# Patient Record
Sex: FEMALE | Race: BLACK OR AFRICAN AMERICAN | Employment: FULL TIME | URBAN - METROPOLITAN AREA
[De-identification: names, ages, dates, MRNs, and addresses within clinical notes are randomized per-mention and may not be internally consistent; named-entity substitution may affect disease eponyms.]

---

## 2022-07-08 ENCOUNTER — HOSPITAL ENCOUNTER (OUTPATIENT)
Age: 32
Setting detail: SPECIMEN
Discharge: HOME | End: 2022-07-08

## 2022-07-08 ENCOUNTER — HOSPITAL ENCOUNTER (OUTPATIENT)
Age: 32
Discharge: HOME | End: 2022-07-08
Payer: MEDICAID

## 2022-07-08 LAB
ABSOLUTE EOS #: 0.05 K/UL (ref 0–0.44)
ABSOLUTE IMMATURE GRANULOCYTE: <0.03 K/UL (ref 0–0.3)
ABSOLUTE LYMPH #: 2.64 K/UL (ref 1.1–3.7)
ABSOLUTE MONO #: 0.53 K/UL (ref 0.1–1.2)
ABSOLUTE RETIC #: 0.09 M/UL (ref 0.03–0.08)
ALBUMIN SERPL-MCNC: 4.3 G/DL (ref 3.5–5.2)
ALBUMIN/GLOBULIN RATIO: 1.7 (ref 1–2.5)
ALP BLD-CCNC: 59 U/L (ref 35–104)
ALT SERPL-CCNC: 13 U/L (ref 5–33)
ANION GAP SERPL CALCULATED.3IONS-SCNC: 12 MMOL/L (ref 9–17)
AST SERPL-CCNC: 19 U/L
BASOPHILS # BLD: 0 % (ref 0–2)
BASOPHILS ABSOLUTE: <0.03 K/UL (ref 0–0.2)
BILIRUB SERPL-MCNC: 0.21 MG/DL (ref 0.3–1.2)
BUN BLDV-MCNC: 8 MG/DL (ref 6–20)
CALCIUM SERPL-MCNC: 9.1 MG/DL (ref 8.6–10.4)
CHLORIDE BLD-SCNC: 102 MMOL/L (ref 98–107)
CHOLESTEROL/HDL RATIO: 2.5
CHOLESTEROL: 106 MG/DL
CO2: 21 MMOL/L (ref 20–31)
CREAT SERPL-MCNC: 0.75 MG/DL (ref 0.5–0.9)
EOSINOPHILS RELATIVE PERCENT: 1 % (ref 1–4)
GFR AFRICAN AMERICAN: >60 ML/MIN
GFR NON-AFRICAN AMERICAN: >60 ML/MIN
GFR SERPL CREATININE-BSD FRML MDRD: ABNORMAL ML/MIN/{1.73_M2}
GLUCOSE BLD-MCNC: 82 MG/DL (ref 70–99)
HCT VFR BLD CALC: 38.6 % (ref 36.3–47.1)
HDLC SERPL-MCNC: 43 MG/DL
HEMOGLOBIN: 12.2 G/DL (ref 11.9–15.1)
HEPATITIS B SURFACE ANTIGEN: NONREACTIVE
IMMATURE GRANULOCYTES: 0 %
IMMATURE RETIC FRACT: 15.1 % (ref 2.7–18.3)
IRON SATURATION: 20 % (ref 20–55)
IRON: 78 UG/DL (ref 37–145)
LDL CHOLESTEROL: 41 MG/DL (ref 0–130)
LYMPHOCYTES # BLD: 32 % (ref 24–43)
MCH RBC QN AUTO: 27.5 PG (ref 25.2–33.5)
MCHC RBC AUTO-ENTMCNC: 31.6 G/DL (ref 28.4–34.8)
MCV RBC AUTO: 86.9 FL (ref 82.6–102.9)
MONOCYTES # BLD: 6 % (ref 3–12)
NRBC AUTOMATED: 0 PER 100 WBC
PDW BLD-RTO: 14.1 % (ref 11.8–14.4)
PLATELET # BLD: 465 K/UL (ref 138–453)
PMV BLD AUTO: 9.8 FL (ref 8.1–13.5)
POTASSIUM SERPL-SCNC: 4.1 MMOL/L (ref 3.7–5.3)
RBC # BLD: 4.44 M/UL (ref 3.95–5.11)
RETIC %: 2.1 % (ref 0.5–1.9)
RETIC HEMOGLOBIN: 33.3 PG (ref 28.2–35.7)
SEG NEUTROPHILS: 61 % (ref 36–65)
SEGMENTED NEUTROPHILS ABSOLUTE COUNT: 5.1 K/UL (ref 1.5–8.1)
SODIUM BLD-SCNC: 135 MMOL/L (ref 135–144)
TOTAL IRON BINDING CAPACITY: 396 UG/DL (ref 250–450)
TOTAL PROTEIN: 6.8 G/DL (ref 6.4–8.3)
TRIGL SERPL-MCNC: 108 MG/DL
TSH SERPL DL<=0.05 MIU/L-ACNC: 1.13 UIU/ML (ref 0.3–5)
UNSATURATED IRON BINDING CAPACITY: 318 UG/DL (ref 112–347)
VITAMIN D 25-HYDROXY: 20.6 NG/ML
WBC # BLD: 8.4 K/UL (ref 3.5–11.3)

## 2022-07-08 PROCEDURE — 83550 IRON BINDING TEST: CPT

## 2022-07-08 PROCEDURE — 86704 HEP B CORE ANTIBODY TOTAL: CPT

## 2022-07-08 PROCEDURE — 85025 COMPLETE CBC W/AUTO DIFF WBC: CPT

## 2022-07-08 PROCEDURE — 84443 ASSAY THYROID STIM HORMONE: CPT

## 2022-07-08 PROCEDURE — 80061 LIPID PANEL: CPT

## 2022-07-08 PROCEDURE — 85045 AUTOMATED RETICULOCYTE COUNT: CPT

## 2022-07-08 PROCEDURE — 82306 VITAMIN D 25 HYDROXY: CPT

## 2022-07-08 PROCEDURE — 87340 HEPATITIS B SURFACE AG IA: CPT

## 2022-07-08 PROCEDURE — 86317 IMMUNOASSAY INFECTIOUS AGENT: CPT

## 2022-07-08 PROCEDURE — 80053 COMPREHEN METABOLIC PANEL: CPT

## 2022-07-08 PROCEDURE — 86708 HEPATITIS A ANTIBODY: CPT

## 2022-07-08 PROCEDURE — 36415 COLL VENOUS BLD VENIPUNCTURE: CPT

## 2022-07-08 PROCEDURE — 83540 ASSAY OF IRON: CPT

## 2022-07-09 LAB
HAV AB SERPL IA-ACNC: NONREACTIVE
HEPATITIS B CORE TOTAL ANTIBODY: NONREACTIVE
SOURCE: NORMAL
TRICHOMONAS VAGINALI, MOLECULAR: NEGATIVE

## 2022-07-10 LAB — HBV SURFACE AB TITR SER: >1000 MIU/ML

## 2022-07-11 LAB
C. TRACHOMATIS DNA ,URINE: NEGATIVE
N. GONORRHOEAE DNA, URINE: NEGATIVE
SPECIMEN DESCRIPTION: NORMAL

## 2022-09-22 ENCOUNTER — HOSPITAL ENCOUNTER (EMERGENCY)
Age: 32
Discharge: HOME | End: 2022-09-22
Attending: EMERGENCY MEDICINE
Payer: MEDICAID

## 2022-09-22 VITALS
SYSTOLIC BLOOD PRESSURE: 147 MMHG | HEART RATE: 78 BPM | WEIGHT: 208 LBS | OXYGEN SATURATION: 99 % | DIASTOLIC BLOOD PRESSURE: 84 MMHG | HEIGHT: 63 IN | BODY MASS INDEX: 36.86 KG/M2 | TEMPERATURE: 99 F | RESPIRATION RATE: 18 BRPM

## 2022-09-22 DIAGNOSIS — R21 RASH AND OTHER NONSPECIFIC SKIN ERUPTION: Primary | ICD-10-CM

## 2022-09-22 PROCEDURE — 99283 EMERGENCY DEPT VISIT LOW MDM: CPT

## 2022-09-22 PROCEDURE — 6370000000 HC RX 637 (ALT 250 FOR IP): Performed by: STUDENT IN AN ORGANIZED HEALTH CARE EDUCATION/TRAINING PROGRAM

## 2022-09-22 RX ORDER — DIAPER,BRIEF,INFANT-TODD,DISP
EACH MISCELLANEOUS
Qty: 20 G | Refills: 0 | Status: SHIPPED | OUTPATIENT
Start: 2022-09-22 | End: 2022-09-29

## 2022-09-22 RX ORDER — CETIRIZINE HYDROCHLORIDE 10 MG/1
10 TABLET ORAL DAILY
Qty: 10 TABLET | Refills: 0 | Status: SHIPPED | OUTPATIENT
Start: 2022-09-22 | End: 2022-10-02

## 2022-09-22 RX ORDER — CETIRIZINE HYDROCHLORIDE 10 MG/1
10 TABLET ORAL ONCE
Status: COMPLETED | OUTPATIENT
Start: 2022-09-22 | End: 2022-09-22

## 2022-09-22 RX ADMIN — CETIRIZINE HYDROCHLORIDE 10 MG: 10 TABLET ORAL at 09:02

## 2022-09-22 ASSESSMENT — ENCOUNTER SYMPTOMS
NAUSEA: 0
VOMITING: 0
SHORTNESS OF BREATH: 0
COUGH: 0
BACK PAIN: 0
ABDOMINAL PAIN: 0

## 2022-09-22 ASSESSMENT — PAIN - FUNCTIONAL ASSESSMENT: PAIN_FUNCTIONAL_ASSESSMENT: NONE - DENIES PAIN

## 2022-09-22 NOTE — ED PROVIDER NOTES
[facility]     Emergency Department     Faculty Attestation    I performed a history and physical examination of the patient and discussed management with the resident. I have reviewed and agree with the residents findings including all diagnostic interpretations, and treatment plans as written at the time of my review. Any areas of disagreement are noted on the chart. I was personally present for the key portions of any procedures. I have documented in the chart those procedures where I was not present during the key portions. For Physician Assistant/ Nurse Practitioner cases/documentation I have personally evaluated this patient and have completed at least one if not all key elements of the E/M (history, physical exam, and MDM). Additional findings are as noted. Primary Care Physician: No primary care provider on file. History: This is a 34 y.o. female who presents to the Emergency Department with complaint of rash. Patient presents emerged with complaint of a rash that has been ongoing for last 2 to 3 months. Patient states they come and go. The.  As red bumps. She scratches them and then they open up and drain. No other family members have a similar issue. She denies any difficulty breathing swallowing chest tightness. Patient denies any fever, chills or sweats. Physical:   height is 5' 3\" (1.6 m) and weight is 208 lb (94.3 kg). Her oral temperature is 99 °F (37.2 °C). Her blood pressure is 147/84 (abnormal) and her pulse is 78. Her respiration is 18 and oxygen saturation is 99%. Patient has several very small 2 to 3 mm erythematous lesions noted on the extremities. There appear to be in different stages. I do not see any vesicular lesions. There is no surrounding erythema.     Impression: Skin rash    Plan: Topical hydrocortisone cream.  Patient was given PCP follow-up list.    (Please note that portions of this note were completed with a voice recognition program.  Efforts were made to edit the dictations but occasionally words are mis-transcribed.)    [place].  Mylene Alvarenga MD, [facility]  Attending Emergency Medicine Physician        Cynthia Morales MD  09/22/22 5792

## 2022-09-22 NOTE — ED NOTES
Pt arrived to ED by self. Pt complains of an itching rash that has been present on both upper and lower extremities bilaterally that has been present for about 2 months now. The pt states that scabs form after scratching the areas. Pt denies any medical history. Pt states that they donate plasma regularly and is concerned of mckinley something from this. Pt is also concerned for bed bugs or monkey pox. Pt alert and oriented.       Vernon Steve RN  09/22/22 5710

## 2022-09-22 NOTE — ED PROVIDER NOTES
[facility] ED  Emergency Department Encounter  Emergency Medicine Resident     Pt Name: Madalyn Carrasco  MRN: [id number]  [place] 1990  Date of evaluation: 9/22/22  PCP:  No primary care provider on file. CHIEF COMPLAINT       Chief Complaint   Patient presents with    Pruritis       HISTORY OFPRESENT ILLNESS  (Location/Symptom, Timing/Onset, Context/Setting, Quality, Duration, Modifying Factors,Severity.)      Madalyn Carrasco is a 34 y. o.yo female who presents with nonspecific pruritic rash that started about 2 months ago that patient explains that is over her bilateral upper extremities and lower extremities. Patient states that she thought that she could have been exposed to a gentleman who had similar rash but unsure. Patient concerned that she might have mild hypoxia. She states that the rash is worsened at nighttime. She states that no one else in the household has similar rash presentation. She denies any fever, night sweats, generalized body aches. She did complain of intermittent chills but no cough, no shortness of breath, no chest pain. Patient states that she has not taken anything for her rash. PAST MEDICAL / SURGICAL / SOCIAL / FAMILY HISTORY      has no past medical history on file. has no past surgical history on file.      Social History     Socioeconomic History    Marital status: Single     Spouse name: Not on file    Number of children: Not on file    Years of education: Not on file    Highest education level: Not on file   Occupational History    Not on file   Tobacco Use    Smoking status: Not on file    Smokeless tobacco: Not on file   Substance and Sexual Activity    Alcohol use: Not on file    Drug use: Yes     Types: Marijuana Nanci Gouty)    Sexual activity: Not on file   Other Topics Concern    Not on file   Social History Narrative    Not on file     Social Determinants of Health     Financial Resource Strain: Not on file   Food Insecurity: Not on file   Transportation Needs: Not on file   Physical Activity: Not on file   Stress: Not on file   Social Connections: Not on file   Intimate Partner Violence: Not on file   Housing Stability: Not on file       No family history on file. Allergies:  Patient has no known allergies. Home Medications:  Prior to Admission medications    Medication Sig Start Date End Date Taking? Authorizing Provider   cetirizine (ZYRTEC) 10 MG tablet Take 1 tablet by mouth daily for 10 days 9/22/22 10/2/22 Yes Akil Vargas MD   hydrocortisone 1 % cream Apply topically 2 -3 times daily for 5 - 7 days. 9/22/22 9/29/22 Yes Akil Vargas MD       REVIEW OFSYSTEMS    (2-9 systems for level 4, 10 or more for level 5)      Review of Systems   Constitutional:  Positive for chills. Negative for fever. HENT:  Negative for dental problem. Eyes:  Negative for visual disturbance. Respiratory:  Negative for cough and shortness of breath. Cardiovascular:  Negative for chest pain and leg swelling. Gastrointestinal:  Negative for abdominal pain, nausea and vomiting. Genitourinary:  Negative for flank pain and frequency. Musculoskeletal:  Negative for back pain. Skin:  Positive for rash. Neurological:  Negative for light-headedness. Psychiatric/Behavioral:  Negative for behavioral problems and confusion. PHYSICAL EXAM   (up to 7 for level 4, 8 or more forlevel 5)      INITIAL VITALS:   ED Triage Vitals [09/22/22 0816]   BP Temp Temp Source Heart Rate Resp SpO2 Height Weight   (!) 147/84 99 °F (37.2 °C) Oral 78 18 99 % 5' 3\" (1.6 m) 208 lb (94.3 kg)       Physical Exam  Constitutional:       Appearance: Normal appearance. HENT:      Head: Normocephalic and atraumatic. Nose: Nose normal.      Mouth/Throat:      Mouth: Mucous membranes are moist.   Eyes:      Extraocular Movements: Extraocular movements intact. Pupils: Pupils are equal, round, and reactive to light.    Cardiovascular:      Rate and Rhythm: Normal rate. Pulmonary:      Effort: Pulmonary effort is normal. No respiratory distress. Abdominal:      General: There is no distension. Palpations: Abdomen is soft. Tenderness: There is no abdominal tenderness. Musculoskeletal:         General: No swelling or tenderness. Normal range of motion. Cervical back: Normal range of motion. No rigidity. Skin:     Findings: Rash present. Comments: Patient does have excoriated rash over her bilateral forearm, her leg. No signs of acute rash that is nonblanching or that is even blanching, no erythema,   Neurological:      General: No focal deficit present. Mental Status: She is alert. Psychiatric:         Mood and Affect: Mood normal.         Behavior: Behavior normal.       DIFFERENTIAL  DIAGNOSIS     PLAN (LABS / IMAGING / EKG):  No orders of the defined types were placed in this encounter. MEDICATIONS ORDERED:  Orders Placed This Encounter   Medications    cetirizine (ZYRTEC) tablet 10 mg    cetirizine (ZYRTEC) 10 MG tablet     Sig: Take 1 tablet by mouth daily for 10 days     Dispense:  10 tablet     Refill:  0    hydrocortisone 1 % cream     Sig: Apply topically 2 -3 times daily for 5 - 7 days. Dispense:  20 g     Refill:  0       Initial MDM/Plan: 34 y.o. female who presents with non specific rash   Rash that is pruritic. Plan for Zyrtec to help with symptoms. Will be discharged with hydrocortisone cream.  Patient given strict return precautions to return if things are not getting better. Follow-up with PCP. DIAGNOSTIC RESULTS / EMERGENCYDEPARTMENT COURSE / MDM     LABS:  Labs Reviewed - No data to display      RADIOLOGY:  No results found.       EKG      All EKG's are interpreted by the Emergency Department Physicianwho either signs or Co-signs this chart in the absence of a cardiologist.    EMERGENCY DEPARTMENT COURSE:          PROCEDURES:  None    CONSULTS:  None    CRITICAL CARE:      FINAL IMPRESSION      1.

## 2022-09-22 NOTE — Clinical Note
Martha Lance was seen and treated in our emergency department on 9/22/2022. She may return to work on 09/22/2022. If you have any questions or concerns, please don't hesitate to call.       Coby Angel MD

## 2022-09-22 NOTE — DISCHARGE INSTRUCTIONS
You will be started on hydrocortisone cream for the rash. You will be started on Zyrtec to help with the itching. Please use prescribed. Do not use a hydrocortisone cream for more than 5 to 7 days. If rash is worsening you develop fever, there is nausea and vomiting, there is chest pain, there is shortness of breath, please return to the emergency room for evaluation. Please also follow-up with [facility] to establish PCP.

## 2022-09-26 ENCOUNTER — HOSPITAL ENCOUNTER (EMERGENCY)
Age: 32
Discharge: HOME | End: 2022-09-27
Attending: EMERGENCY MEDICINE
Payer: MEDICAID

## 2022-09-26 VITALS
DIASTOLIC BLOOD PRESSURE: 81 MMHG | HEIGHT: 63 IN | WEIGHT: 208 LBS | TEMPERATURE: 97.9 F | HEART RATE: 96 BPM | RESPIRATION RATE: 16 BRPM | OXYGEN SATURATION: 100 % | SYSTOLIC BLOOD PRESSURE: 144 MMHG | BODY MASS INDEX: 36.86 KG/M2

## 2022-09-26 DIAGNOSIS — S61.212A LACERATION OF RIGHT MIDDLE FINGER WITHOUT FOREIGN BODY WITHOUT DAMAGE TO NAIL, INITIAL ENCOUNTER: Primary | ICD-10-CM

## 2022-09-26 PROCEDURE — 12001 RPR S/N/AX/GEN/TRNK 2.5CM/<: CPT

## 2022-09-26 PROCEDURE — 99283 EMERGENCY DEPT VISIT LOW MDM: CPT

## 2022-09-26 ASSESSMENT — PAIN - FUNCTIONAL ASSESSMENT: PAIN_FUNCTIONAL_ASSESSMENT: 0-10

## 2022-09-26 ASSESSMENT — PAIN SCALES - GENERAL: PAINLEVEL_OUTOF10: 10

## 2022-09-26 ASSESSMENT — PAIN DESCRIPTION - LOCATION: LOCATION: FINGER (COMMENT WHICH ONE)

## 2022-09-26 ASSESSMENT — PAIN DESCRIPTION - ORIENTATION: ORIENTATION: RIGHT

## 2022-09-26 NOTE — LETTER
[facility]  [address] [address]  Phone: [phone]               September 28, 2022    Patient: Laura Ortega   YOB: 1990   Date of Visit: 9/26/2022       To Whom It May Concern:    Laura Ortega was seen and treated in our emergency department on 9/26/2022. She may return to work on 09/28/2022 100% no restrictions. Please allow her to keep her wound clean and dry until the sutures can be removed 10/4/2022. She will need to be able to keep a bandage on her hand under her gloves and be able to take breaks to change this bandage if it gets wet or soiled. If you have any questions or concerns, please don't hesitate to call.       Kailey Sutherland DO        Signature:__________________________________

## 2022-09-26 NOTE — Clinical Note
Celso Mckeon was seen and treated in our emergency department on 9/26/2022. She may return to work on 09/28/2022. Please allow her to keep her wound clean and dry until the sutures can be removed 10/4/2022. She will need to be able to keep a bandage on her hand under her gloves and be able to take breaks to change this bandage if it gets wet or soiled. If you have any questions or concerns, please don't hesitate to call.       Louisa Angulo, DO

## 2022-09-27 PROCEDURE — 6370000000 HC RX 637 (ALT 250 FOR IP): Performed by: STUDENT IN AN ORGANIZED HEALTH CARE EDUCATION/TRAINING PROGRAM

## 2022-09-27 RX ORDER — CEPHALEXIN 500 MG/1
500 CAPSULE ORAL ONCE
Status: COMPLETED | OUTPATIENT
Start: 2022-09-27 | End: 2022-09-27

## 2022-09-27 RX ORDER — BACITRACIN, NEOMYCIN, POLYMYXIN B 400; 3.5; 5 [USP'U]/G; MG/G; [USP'U]/G
OINTMENT TOPICAL
Qty: 28 G | Refills: 0 | Status: SHIPPED | OUTPATIENT
Start: 2022-09-27 | End: 2022-10-07

## 2022-09-27 RX ORDER — CEPHALEXIN 500 MG/1
500 CAPSULE ORAL 2 TIMES DAILY
Qty: 14 CAPSULE | Refills: 0 | Status: SHIPPED | OUTPATIENT
Start: 2022-09-27 | End: 2022-10-04

## 2022-09-27 RX ADMIN — CEPHALEXIN 500 MG: 500 CAPSULE ORAL at 01:25

## 2022-09-27 ASSESSMENT — ENCOUNTER SYMPTOMS
CONSTIPATION: 0
NAUSEA: 0
ABDOMINAL PAIN: 0
COUGH: 0
DIARRHEA: 0
RHINORRHEA: 0
SHORTNESS OF BREATH: 0
BACK PAIN: 0
SORE THROAT: 0
VOMITING: 0

## 2022-09-27 NOTE — ED PROVIDER NOTES
[facility]  ED  Emergency Department Encounter  Emergency Medicine Resident     Pt Name: Lorelei Quinones  MRN: [id number]  [place] 1990  Date of evaluation: 9/27/22  PCP:  No primary care provider on file. CHIEF COMPLAINT       Finger laceration      HISTORY OFPRESENT ILLNESS  (Location/Symptom, Timing/Onset, Context/Setting, Quality, Duration, Modifying Zoie Puckett.)      Lorelei Quinones is a 34 y.o. female who presents with a laceration to her right middle finger which occurred a few hours ago on a metal broom stick. Patient states there was a large amount of bleeding initially but it is now controlled. She denies any other injuries. She denies numbness, tingling, weakness. Patient states she washed out the wound at home but has not taken any medications for her symptoms. Patient's tetanus is not up-to-date. PAST MEDICAL / SURGICAL / SOCIAL / FAMILY HISTORY     Patient denies past medical or surgical history    Social:  reports current drug use. Drug: Marijuana Brynn Guzmán). Family Hx: No family history on file. Allergies:  Patient has no known allergies. Home Medications:  Prior to Admission medications    Medication Sig Start Date End Date Taking? Authorizing Provider   cephALEXin (KEFLEX) 500 MG capsule Take 1 capsule by mouth 2 times daily for 7 days 9/27/22 10/4/22 Yes Ambreen Anderson, DO   neomycin-bacitracin-polymyxin (NEOSPORIN) 400-5-5000 ointment Apply topically 2 times daily. 9/27/22 10/7/22 Yes Ambreen Harman, DO   cetirizine (ZYRTEC) 10 MG tablet Take 1 tablet by mouth daily for 10 days 9/22/22 10/2/22  Gita Tuttle MD   hydrocortisone 1 % cream Apply topically 2 -3 times daily for 5 - 7 days. 9/22/22 9/29/22  Gita Tuttle MD       REVIEW OFSYSTEMS    (2-9 systems for level 4, 10 or more for level 5)      Review of Systems   Constitutional:  Negative for chills and fever. HENT:  Negative for congestion, rhinorrhea and sore throat. Respiratory:  Negative for cough and shortness of breath. Cardiovascular:  Negative for chest pain and leg swelling. Gastrointestinal:  Negative for abdominal pain, constipation, diarrhea, nausea and vomiting. Musculoskeletal:  Negative for arthralgias, back pain and neck pain. Skin:  Positive for wound. Negative for rash. Neurological:  Negative for weakness, light-headedness, numbness and headaches. All other systems reviewed and are negative. PHYSICAL EXAM   (up to 7 for level 4, 8 or more forlevel 5)      INITIAL VITALS:   Vitals:    09/26/22 2322   BP: (!) 144/81   Pulse: 96   Resp: 16   Temp: 97.9 °F (36.6 °C)   TempSrc: Oral   SpO2: 100%   Weight: 208 lb (94.3 kg)   Height: 5' 3\" (1.6 m)        Physical Exam  Vitals and nursing note reviewed. Constitutional:       General: She is not in acute distress. Comments: Adult female sitting in stretcher no acute distress. HENT:      Head: Normocephalic and atraumatic. Mouth/Throat:      Mouth: Mucous membranes are moist.      Pharynx: Oropharynx is clear. Eyes:      Pupils: Pupils are equal, round, and reactive to light. Cardiovascular:      Rate and Rhythm: Normal rate and regular rhythm. Pulmonary:      Effort: Pulmonary effort is normal. No respiratory distress. Breath sounds: Normal breath sounds. Abdominal:      General: There is no distension. Palpations: Abdomen is soft. Tenderness: There is no abdominal tenderness. Musculoskeletal:      Cervical back: Neck supple. No rigidity. Skin:     General: Skin is warm and dry. Findings: No rash. Comments: 2 cm linear laceration to the palmar aspect of the middle finger on the right hand just distal to the PIP joint. Bleeding is controlled. Range of motion and sensation intact. 2+ radial pulses bilaterally. Neurological:      Mental Status: She is alert.    Psychiatric:         Mood and Affect: Mood normal.         Behavior: Behavior normal. DIFFERENTIAL  DIAGNOSIS     DDX: Laceration    Initial MDM/Plan: 34 y.o. female who presents with a laceration to her right middle finger which occurred a few hours ago on a metal broom stick. Bleeding is controlled. Physical exam is otherwise benign. Given the location of the laceration, even though small and somewhat superficial, do think it requires sutures. Will wash wound thoroughly and repair. Will update patient's tetanus. DIAGNOSTIC RESULTS / EMERGENCYDEPARTMENT COURSE / MDM     LABS:  No results found for this visit on 09/26/22. RADIOLOGY:  No results found. EKG  None      MEDICATIONS ORDERED:  Orders Placed This Encounter   Medications    DISCONTD: Tetanus-Diphth-Acell Pertussis (BOOSTRIX) injection 0.5 mL    cephALEXin (KEFLEX) capsule 500 mg     Order Specific Question:   Antimicrobial Indications     Answer:   Skin and Soft Tissue Infection    cephALEXin (KEFLEX) 500 MG capsule     Sig: Take 1 capsule by mouth 2 times daily for 7 days     Dispense:  14 capsule     Refill:  0    neomycin-bacitracin-polymyxin (NEOSPORIN) 400-5-5000 ointment     Sig: Apply topically 2 times daily. Dispense:  28 g     Refill:  0         PROCEDURES:  PROCEDURE NOTE - LACERATION CLOSURE    PATIENT NAME: Santana Whitley  MEDICAL RECORD NO. [id number]  DATE: 9/27/2022  ATTENDING PHYSICIAN: Dr Nichelle Harrell DIAGNOSIS: Laceration(s) as follows:  -Location: right 3rd digit  -Length: 1.5 cm  -Layered closure: No    POSTOPERATIVE DIAGNOSIS:  Same  PROCEDURE PERFORMED:  Suture closure of laceration  PERFORMING PHYSICIAN: Rebekah Lama DO  ANESTHESIA:  Local utilizing  Lidocaine 1% without epinephrine  ESTIMATED BLOOD LOSS:  Less than 25 ml. DISCUSSION:  Santana Whitley is a 34y.o.-year-old female. Patient requires laceration repair. The history and physical examination were reviewed and confirmed. CONSENT: The patient provided verbal consent for this procedure.      PROCEDURE:  Prior to starting, the procedure and patient were confirmed by those present. The wound area was irrigated with sterile saline, cleansed with povidone iodine and draped in a sterile fashion. The wound area was anesthetized with Lidocaine 1% without epinephrine. The wound was explored with the following results No foreign bodies found. The wound was repaired with 4-0 Ethilon using interrupted sutures. The wound was dressed with bacitracin and a bandage. All sponge, instrument and needle counts were correct at the completion of the procedure. The patient tolerated the procedure well. SUTURE COUNT:  Suture count: 3    COMPLICATIONS:  None     Lynchbeatrice Meyer, DO  2:45 AM, 9/27/22          CONSULTS:  None      EMERGENCY DEPARTMENT COURSE:  0045: Laceration was repaired without complication. Patient tolerated well. She declined her tetanus shot. Risks and benefits of doing so were discussed with the patient and she still declines at this time. Patient was given dose of Keflex here and will be discharged home on the same, as well as a prescription for bacitracin. Work note provided. She is to return for any worsening symptoms. FINAL IMPRESSION      1.  Laceration of right middle finger without foreign body without damage to nail, initial encounter          DISPOSITION / PLAN     DISPOSITION Decision To Discharge 09/27/2022 12:52:48 AM      PATIENT REFERRED TO:  [address] [place] [id number] [phone]  Schedule an appointment as soon as possible for a visit       [facility] [place] ED  [address] [place] [address]  [phone]    If symptoms worsen    DISCHARGE MEDICATIONS:  Discharge Medication List as of 9/27/2022 12:58 AM        START taking these medications    Details   cephALEXin (KEFLEX) 500 MG capsule Take 1 capsule by mouth 2 times daily for 7 days, Disp-14 capsule, R-0Print      neomycin-bacitracin-polymyxin (NEOSPORIN) 400-5-5000 ointment Apply topically 2 times daily. , Disp-28 g, R-0, Print             Mike Estevez DO  Emergency Medicine Resident    (Please note that portions of this note were completed with a voice recognition program.Efforts were made to edit the dictations but occasionally words are mis-transcribed.)        Mike Estevez DO  Resident  09/27/22 7384

## 2022-09-27 NOTE — DISCHARGE INSTRUCTIONS
For your symptoms, we repaired your laceration with 3 stitches. These will need to be removed in 7 days. You can return to the emergency department go to your primary care or go to an urgent care to have these removed. Suture removal date: 10/4/22    To care for your wound at home, wash with warm water and gentle unscented soap like Dove soap or Shruthi dish soap. You do not need to scrub the wound but pat dry. Please keep the wound clean and dry. Place bacitracin or Neosporin over the wound several times a day to keep the sutures moist.  This will help and be easier to remove, prevent infection and prevent scarring. Avoid soaking your hand in any water such as pools, bath tubs, hot tubs, lakes, etc. until the sutures are removed. You will need to take an antibiotic called Keflex twice a day for the next 7 days. Please take all 7 days of this medication to prevent infection. We recommended a tetanus shot today, however you declined. I would still recommend you getting the tetanus shot. If you change your mind you can go to your primary care physician or the health department. You can also return for the shot when to get your sutures removed. If you develop any worsening of her symptoms, severe pain, chest pain, trouble breathing, passing out, fevers, vomiting, worsening pain, redness, swelling, drainage from the wound that looks like pus or other worrisome symptoms, please return to the emergency department immediately.

## 2022-09-27 NOTE — ED PROVIDER NOTES
[facility]     Emergency Department     Faculty Attestation    I performed a history and physical examination of the patient and discussed management with the resident. I have reviewed and agree with the residents findings including all diagnostic interpretations, and treatment plans as written. Any areas of disagreement are noted on the chart. I was personally present for the key portions of any procedures. I have documented in the chart those procedures where I was not present during the key portions. I have reviewed the emergency nurses triage note. I agree with the chief complaint, past medical history, past surgical history, allergies, medications, social and family history as documented unless otherwise noted below. Documentation of the HPI, Physical Exam and Medical Decision Making performed by scribkadie is based on my personal performance of the HPI, PE and MDM. For Physician Assistant/ Nurse Practitioner cases/documentation I have personally evaluated this patient and have completed at least one if not all key elements of the E/M (history, physical exam, and MDM). Additional findings are as noted. 33 yo F laceration R 3rd finger lac, last dT > 8 day,   PE vss laceration volar surface R 3rd digit, at pip, tendon function intact,   Nv intact R 3rd digit,     -wound cleansed / + closure / I was present for key portion,     EKG Interpretation    Interpreted by me      CRITICAL CARE: There was a high probability of clinically significant/life threatening deterioration in this patient's condition which required my urgent intervention. Total critical care time was 0 minutes. This excludes any time for separately reportable procedures.        [address], DO  09/27/22 [address] , DO  09/27/22 0711

## 2022-10-03 ENCOUNTER — HOSPITAL ENCOUNTER (EMERGENCY)
Age: 32
Discharge: HOME | End: 2022-10-03
Attending: EMERGENCY MEDICINE
Payer: MEDICAID

## 2022-10-03 VITALS
BODY MASS INDEX: 36.86 KG/M2 | RESPIRATION RATE: 16 BRPM | WEIGHT: 208 LBS | HEIGHT: 63 IN | TEMPERATURE: 98.1 F | DIASTOLIC BLOOD PRESSURE: 82 MMHG | OXYGEN SATURATION: 98 % | HEART RATE: 72 BPM | SYSTOLIC BLOOD PRESSURE: 121 MMHG

## 2022-10-03 DIAGNOSIS — Z48.02 VISIT FOR SUTURE REMOVAL: Primary | ICD-10-CM

## 2022-10-03 PROCEDURE — 99282 EMERGENCY DEPT VISIT SF MDM: CPT

## 2022-10-03 ASSESSMENT — PAIN - FUNCTIONAL ASSESSMENT: PAIN_FUNCTIONAL_ASSESSMENT: NONE - DENIES PAIN

## 2022-10-03 ASSESSMENT — ENCOUNTER SYMPTOMS: SHORTNESS OF BREATH: 0

## 2022-10-03 NOTE — DISCHARGE INSTRUCTIONS
[place]!!!    From [facility] Department    On behalf of the Emergency Department staff at [facility]. [facility] Emergency Department, I would like to thank you for giving [facility] the opportunity to address your health care needs and concerns. We hope that during your visit, our service was delivered in a professional and caring manner. Please keep Dede Deshpandella in mind as we walk with you down the path to your own personal wellness. Please expect an automated phone call from [phone] so we can ask a few questions about your health and progress. Based on your answers, a clinician may call you back to offer help and instructions. If you notice any concerning symptoms please return to the ER immediately. These can include but are not limited to: fevers, chills, shortness of breath, vomiting, weakness of the extremities, changes in your mental status, numbness, pale extremities, or chest pain. Wound care: none    Diet: You may resume your normal diet     Activity: resume activity as tolerated. Medications: Continue taking your home medications as previously directed. For pain You may take tylenol 1,000mg by mouth every 6 hours as needed for pain. Do not exceed 4,000mg per day. If you have liver disease don't take tylenol. You may also take ibuprofen 600mg every 6-8 hours as needed for pain. Do not exceed 2,400 mg per day. If you experience stomach pain or you have a history of kidney disease stop taking ibuprofen. You may alternate application of ice and heat 20 minutes at a time as desired. Follow up: Please follow up with your primary care doctor within one week or as needed.

## 2022-10-03 NOTE — Clinical Note
Jaquan Guillen was seen and treated in our emergency department on 10/3/2022. Jaquan Guillen was seen and treated in our emergency department on 10/3/2022. She may return to work on 10/04/2022. Her stiches has been removed and she can return to work 100% with no restrictions. If you have any questions or concerns, please don't hesitate to call.  Andy Blizzard, DO    Andy Blizzard, DO

## 2022-10-03 NOTE — ED PROVIDER NOTES
WILLIAM Edwards DO   neomycin-bacitracin-polymyxin (NEOSPORIN) 400-5-5000 ointment Apply topically 2 times daily. 9/27/22 10/7/22  Mirella Turcios DO       REVIEW OF SYSTEMS    (2-9 systems for level 4, 10 or more for level 5)      Review of Systems   Constitutional:  Negative for chills and fever. Respiratory:  Negative for shortness of breath. Cardiovascular:  Negative for chest pain. Musculoskeletal:  Negative for myalgias. Skin:  Positive for wound. Allergic/Immunologic: Negative for immunocompromised state. Neurological:  Negative for weakness and numbness. Hematological:  Does not bruise/bleed easily. PHYSICAL EXAM   (up to 7 for level 4, 8 or more for level 5)      INITIAL VITALS:   There were no vitals taken for this visit. Physical Exam  HENT:      Head: Normocephalic and atraumatic. Pulmonary:      Effort: Pulmonary effort is normal.   Musculoskeletal:      Comments: R middle finger with 3 sutures in place. Wound well healed, c/d/I. Sensation intact. ROM intact. Skin:     General: Skin is warm. Neurological:      Mental Status: She is alert. Psychiatric:         Mood and Affect: Mood normal.       DIFFERENTIAL  DIAGNOSIS     PLAN (LABS / IMAGING / EKG):  No orders of the defined types were placed in this encounter. MEDICATIONS ORDERED:  No orders of the defined types were placed in this encounter. MDM: Sutures removed without complication. No s/s of infection. Patient with no other complaints today. States she finished course of abx. Will discharge to home with letters for return to work. DIAGNOSTIC RESULTS / EMERGENCY DEPARTMENT COURSE / MDM   LAB RESULTS:  No results found for this visit on 10/03/22. IMPRESSION: Visit for suture removal. No s/s of infection. Will remove sutures and discharge to home.      RADIOLOGY:  No orders to display       EMERGENCY DEPARTMENT COURSE:      ED Course as of 10/03/22 1523   Mon Oct 03, 2022   1514 Sutures removed without complication. [SK]      ED Course User Index  [SK] Ian Sabillon DO       No notes of EC Admission Criteria type on file. PROCEDURES:  Bedside suture removal     CONSULTS:  None    CRITICAL CARE:      FINAL IMPRESSION      1.  Visit for suture removal          DISPOSITION / PLAN     DISPOSITION Decision To Discharge 10/03/2022 03:09:43 PM      PATIENT REFERRED TO:  [facility] ED  [address] [place] [address]  [phone]    As needed      DISCHARGE MEDICATIONS:  New Prescriptions    No medications on file       Ian Sabillon DO  Emergency Medicine Resident    (Please note that portions of thisnote were completed with a voice recognition program.  Efforts were made to edit the dictations but occasionally words are mis-transcribed.)        Ian Sabillon DO  Resident  10/03/22 7797

## 2022-10-03 NOTE — Clinical Note
Neal Sahni was seen and treated in our emergency department on 10/3/2022. She may return to work on 10/04/2022. If you have any questions or concerns, please don't hesitate to call.       Hellen French, DO

## 2022-10-03 NOTE — ED PROVIDER NOTES
[facility]     Emergency Department     Faculty Attestation    I performed a history and physical examination of the patient and discussed management with the resident. I have reviewed and agree with the residents findings including all diagnostic interpretations, and treatment plans as written at the time of my review. Any areas of disagreement are noted on the chart. I was personally present for the key portions of any procedures. I have documented in the chart those procedures where I was not present during the key portions. For Physician Assistant/ Nurse Practitioner cases/documentation I have personally evaluated this patient and have completed at least one if not all key elements of the E/M (history, physical exam, and MDM). Additional findings are as noted. Sutures removed by the resident. I was present during key portions of the procedure. (Please note that portions of this note were completed with a voice recognition program.  Efforts were made to edit the dictations but occasionally words are mis-transcribed.)    Devika Bartlett.  Sridevi Steen MD, [address] [facility]  Attending Emergency Medicine Physician        Deborah eHrnandez MD  10/03/22 7807

## 2022-10-03 NOTE — Clinical Note
Aj Saldaña was seen and treated in our emergency department on 10/3/2022. She may return to work on 10/04/2022. Aj Saldaña was seen and treated in our emergency department on 10/3/2022. Her stiches have been removed and she is medically clear. If you have any questions or concerns, please don't hesitate to call. Geo Abreu, DO     If you have any questions or concerns, please don't hesitate to call.       Joel Mtaute MD

## 2022-10-03 NOTE — Clinical Note
Rangel Hartmann was seen and treated in our emergency department on 10/3/2022. Rangel Hartmann was seen and treated in our emergency department on 10/3/2022. She may return to work on 10/04/2022. Her stiches has been removed and she can return to work 100% with no restrictions. If you have any questions or concerns, please don't hesitate to call.  [place] Rides, DO    Maame Fletcher MD

## 2022-10-03 NOTE — Clinical Note
Leida Madrigal was seen and treated in our emergency department on 10/3/2022. She may return to work on 10/04/2022. Leida Madrigal was seen and treated in our emergency department on 10/3/2022. Her stiches have been removed and she is medically clear. If you have any questions or concerns, please don't hesitate to call. Temi Hampton, DO     If you have any questions or concerns, please don't hesitate to call.       Temi Hampton, DO

## 2023-03-22 ENCOUNTER — OFFICE VISIT (OUTPATIENT)
Dept: ORTHOPEDIC SURGERY | Age: 33
End: 2023-03-22
Payer: COMMERCIAL

## 2023-03-22 VITALS — HEIGHT: 63 IN | WEIGHT: 216 LBS | BODY MASS INDEX: 38.27 KG/M2

## 2023-03-22 DIAGNOSIS — M15.1 DEGENERATIVE ARTHRITIS OF DISTAL INTERPHALANGEAL JOINT OF RING FINGER OF RIGHT HAND: ICD-10-CM

## 2023-03-22 DIAGNOSIS — M67.431 GANGLION CYST OF DORSUM OF RIGHT WRIST: Primary | ICD-10-CM

## 2023-03-22 PROCEDURE — G8417 CALC BMI ABV UP PARAM F/U: HCPCS | Performed by: STUDENT IN AN ORGANIZED HEALTH CARE EDUCATION/TRAINING PROGRAM

## 2023-03-22 PROCEDURE — 4004F PT TOBACCO SCREEN RCVD TLK: CPT | Performed by: STUDENT IN AN ORGANIZED HEALTH CARE EDUCATION/TRAINING PROGRAM

## 2023-03-22 PROCEDURE — 99204 OFFICE O/P NEW MOD 45 MIN: CPT | Performed by: STUDENT IN AN ORGANIZED HEALTH CARE EDUCATION/TRAINING PROGRAM

## 2023-03-22 PROCEDURE — G8484 FLU IMMUNIZE NO ADMIN: HCPCS | Performed by: STUDENT IN AN ORGANIZED HEALTH CARE EDUCATION/TRAINING PROGRAM

## 2023-03-22 PROCEDURE — G8427 DOCREV CUR MEDS BY ELIG CLIN: HCPCS | Performed by: STUDENT IN AN ORGANIZED HEALTH CARE EDUCATION/TRAINING PROGRAM

## 2023-03-22 RX ORDER — MEDROXYPROGESTERONE ACETATE 150 MG/ML
150 INJECTION, SUSPENSION INTRAMUSCULAR
COMMUNITY
Start: 2023-02-14

## 2023-03-27 NOTE — PROGRESS NOTES
I reviewed with the resident the medical history and the resident's findings on the physical examination. I discussed with the resident the patient's diagnosis and concur with the plan. I independently performed a history and physical exam on the patient and agree with documentation as above.      Maliha Crump, DO
Refill:  3      Orders Placed This Encounter   Procedures    XR HAND RIGHT (MIN 3 VIEWS)     Standing Status:   Future     Number of Occurrences:   1     Standing Expiration Date:   3/22/2024     Order Specific Question:   Reason for exam:     Answer:   monitor         Electronically signed by Rosette Cortez DO on 3/27/2023 at 7:21 AM    This dictation was generated by voice recognition computer software. Although all attempts are made to edit the dictation for accuracy, there may be errors in the transcription that are not intended. The actual meaning should be extrapolated by the context of the sentence.

## 2023-04-11 ENCOUNTER — HOSPITAL ENCOUNTER (OUTPATIENT)
Age: 33
Setting detail: OUTPATIENT SURGERY
Discharge: HOME | End: 2023-04-11
Attending: STUDENT IN AN ORGANIZED HEALTH CARE EDUCATION/TRAINING PROGRAM | Admitting: STUDENT IN AN ORGANIZED HEALTH CARE EDUCATION/TRAINING PROGRAM
Payer: COMMERCIAL

## 2023-04-11 VITALS
HEIGHT: 63 IN | SYSTOLIC BLOOD PRESSURE: 126 MMHG | TEMPERATURE: 97.2 F | HEART RATE: 80 BPM | WEIGHT: 213 LBS | OXYGEN SATURATION: 99 % | BODY MASS INDEX: 37.74 KG/M2 | DIASTOLIC BLOOD PRESSURE: 79 MMHG | RESPIRATION RATE: 16 BRPM

## 2023-04-11 LAB — HCG, PREGNANCY URINE (POC): NEGATIVE

## 2023-04-11 PROCEDURE — 2580000003 HC RX 258: Performed by: ANESTHESIOLOGY

## 2023-04-11 PROCEDURE — 81025 URINE PREGNANCY TEST: CPT

## 2023-04-11 RX ORDER — METOCLOPRAMIDE HYDROCHLORIDE 5 MG/ML
10 INJECTION INTRAMUSCULAR; INTRAVENOUS
Status: CANCELLED | OUTPATIENT
Start: 2023-04-11 | End: 2023-04-12

## 2023-04-11 RX ORDER — DROPERIDOL 2.5 MG/ML
0.62 INJECTION, SOLUTION INTRAMUSCULAR; INTRAVENOUS
Status: CANCELLED | OUTPATIENT
Start: 2023-04-11 | End: 2023-04-12

## 2023-04-11 RX ORDER — SODIUM CHLORIDE, SODIUM LACTATE, POTASSIUM CHLORIDE, CALCIUM CHLORIDE 600; 310; 30; 20 MG/100ML; MG/100ML; MG/100ML; MG/100ML
INJECTION, SOLUTION INTRAVENOUS CONTINUOUS
Status: DISCONTINUED | OUTPATIENT
Start: 2023-04-11 | End: 2023-04-11 | Stop reason: HOSPADM

## 2023-04-11 RX ORDER — DIPHENHYDRAMINE HYDROCHLORIDE 50 MG/ML
12.5 INJECTION INTRAMUSCULAR; INTRAVENOUS
Status: CANCELLED | OUTPATIENT
Start: 2023-04-11 | End: 2023-04-12

## 2023-04-11 RX ORDER — SODIUM CHLORIDE 9 MG/ML
25 INJECTION, SOLUTION INTRAVENOUS PRN
Status: CANCELLED | OUTPATIENT
Start: 2023-04-11

## 2023-04-11 RX ORDER — MEPERIDINE HYDROCHLORIDE 50 MG/ML
12.5 INJECTION INTRAMUSCULAR; INTRAVENOUS; SUBCUTANEOUS EVERY 5 MIN PRN
Status: CANCELLED | OUTPATIENT
Start: 2023-04-11

## 2023-04-11 RX ORDER — HYDRALAZINE HYDROCHLORIDE 20 MG/ML
10 INJECTION INTRAMUSCULAR; INTRAVENOUS
Status: CANCELLED | OUTPATIENT
Start: 2023-04-11

## 2023-04-11 RX ORDER — SODIUM CHLORIDE 0.9 % (FLUSH) 0.9 %
5-40 SYRINGE (ML) INJECTION PRN
Status: CANCELLED | OUTPATIENT
Start: 2023-04-11

## 2023-04-11 RX ORDER — SODIUM CHLORIDE 0.9 % (FLUSH) 0.9 %
5-40 SYRINGE (ML) INJECTION EVERY 12 HOURS SCHEDULED
Status: CANCELLED | OUTPATIENT
Start: 2023-04-11

## 2023-04-11 RX ADMIN — SODIUM CHLORIDE, POTASSIUM CHLORIDE, SODIUM LACTATE AND CALCIUM CHLORIDE: 600; 310; 30; 20 INJECTION, SOLUTION INTRAVENOUS at 06:57

## 2023-04-11 ASSESSMENT — PAIN DESCRIPTION - DESCRIPTORS: DESCRIPTORS: DISCOMFORT

## 2023-04-11 ASSESSMENT — PAIN - FUNCTIONAL ASSESSMENT: PAIN_FUNCTIONAL_ASSESSMENT: 0-10

## 2023-04-11 NOTE — H&P
History and Physical    Pt Name: Nelsy Garzon  MRN: [id number]  YOB: 1990  Date of evaluation: 4/11/2023    SUBJECTIVE:   History of Chief Complaint:    Patient presents preprocedure for excision of wrist mass. She says that she has had this for some time, that it has come and go as far as size and pain. She says that the pain affects her fingers as well, interferes with her daily activities. She has been scheduled for excision of wrist mass. Past Medical History    has a past medical history of Snores. Past Surgical History   has no past surgical history on file. Medications  Prior to Admission medications    Medication Sig Start Date End Date Taking? Authorizing Provider   DEPO-PROVERA 150 MG/ML injection Inject 150 mg into the muscle every 3 months 2/14/23   Historical Provider, MD   diclofenac (VOLTAREN) 50 MG EC tablet Take 1 tablet by mouth 2 times daily 3/22/23   Diane Mayo DO     Allergies  has No Known Allergies. Family History  family history includes COPD in her mother; Deep Vein Thrombosis in her mother; Thyroid Disease in her mother. Social History   reports that she has been smoking cigarettes. She has a 1.25 pack-year smoking history. She has never used smokeless tobacco.   reports no history of alcohol use. reports current drug use. Drug: Marijuana Syliva Missy).   Marital Status single  Occupation MA    Review of Systems:  CONSTITUTIONAL:   negative for fevers, chills, fatigue and malaise    EYES:   negative for double vision, blurred vision and photophobia    HEENT:   negative for tinnitus, epistaxis and sore throat     RESPIRATORY:   negative for cough, shortness of breath, wheezing     CARDIOVASCULAR:   negative for chest pain, palpitations, syncope, edema     GASTROINTESTINAL:   negative for nausea, vomiting     GENITOURINARY:   negative for incontinence     MUSCULOSKELETAL:   negative for neck or back pain     NEUROLOGICAL:   Negative for weakness and tingling  negative

## 2023-04-19 ENCOUNTER — OFFICE VISIT (OUTPATIENT)
Dept: ORTHOPEDIC SURGERY | Age: 33
End: 2023-04-19

## 2023-04-19 VITALS — WEIGHT: 221 LBS | BODY MASS INDEX: 39.16 KG/M2 | HEIGHT: 63 IN

## 2023-04-19 DIAGNOSIS — M25.561 PATELLOFEMORAL ARTHRALGIA OF RIGHT KNEE: ICD-10-CM

## 2023-04-19 DIAGNOSIS — M25.561 CHRONIC PAIN OF RIGHT KNEE: ICD-10-CM

## 2023-04-19 DIAGNOSIS — R52 PAIN: Primary | ICD-10-CM

## 2023-04-19 DIAGNOSIS — G89.29 CHRONIC PAIN OF RIGHT KNEE: ICD-10-CM

## 2023-04-19 DIAGNOSIS — M25.571 RIGHT ANKLE PAIN, UNSPECIFIED CHRONICITY: ICD-10-CM

## 2023-04-19 NOTE — PROGRESS NOTES
Occurrences:   1     Standing Expiration Date:   4/19/2024    XR ANKLE RIGHT (MIN 3 VIEWS)     Standing Status:   Future     Number of Occurrences:   1     Standing Expiration Date:   4/19/2024    [facility]     Referral Priority:   Routine     Referral Type:   Eval and Treat     Referral Reason:   Specialty Services Required     Requested Specialty:   Physical Therapist     Number of Visits Requested:   1     Electronically signed by Demarcus Hodges DO 11:23 AM 4/19/2023

## 2023-05-01 ENCOUNTER — HOSPITAL ENCOUNTER (OUTPATIENT)
Dept: PHYSICAL THERAPY | Age: 33
Setting detail: THERAPIES SERIES
Discharge: HOME | End: 2023-05-01
Payer: COMMERCIAL

## 2023-05-01 PROCEDURE — 97161 PT EVAL LOW COMPLEX 20 MIN: CPT

## 2023-05-01 PROCEDURE — 97110 THERAPEUTIC EXERCISES: CPT
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
## 2023-05-05 ENCOUNTER — HOSPITAL ENCOUNTER (OUTPATIENT)
Dept: PHYSICAL THERAPY | Age: 33
Setting detail: THERAPIES SERIES
Discharge: HOME | End: 2023-05-05
Payer: COMMERCIAL

## 2023-05-08 ENCOUNTER — HOSPITAL ENCOUNTER (OUTPATIENT)
Dept: PHYSICAL THERAPY | Age: 33
Setting detail: THERAPIES SERIES
Discharge: HOME | End: 2023-05-08
Payer: COMMERCIAL

## 2023-05-08 PROCEDURE — 97110 THERAPEUTIC EXERCISES: CPT

## 2023-05-08 NOTE — FLOWSHEET NOTE
[x] [facility]  [facility]  [address]  [phone]  F: [phone] [] [address]  [place] [address]  P: [phone]  F: [phone] [] [address]  [address]  P: [phone]  F: [phone] [] [address]  P: [phone]  F: [phone] [] [address]  [facility]   [address]   [place]: [phone]  F: [phone]      Physical Therapy Daily Treatment Note    Date:  2023  Patient Name:  Ebonie Burris    :  1990  MRN: [id number]  Physician: Tom Bond DO                                    Insurance: Diamondyuli Carson ( visits)  Medical Diagnosis: Patellofemoral arthralgia of right knee (M25.561 [ICD-10-CM]) *order says to also strengthen the right ankle                         Rehab Codes: M25.561, M25.571, M25.661, M25.671, M62.81, R26.2  Onset date: 20               Next 's appt.: after 6 weeks of PT  Visit# / total visits: 2/12                                   Cancels/No Shows: 1/0    Subjective:    Pain:  [x] Yes  [] No Location: R knee, R ankle Pain Rating: (0-10 scale) 10/10  Pain altered Tx:  [x] No  [] Yes  Action:  Comments: Pt rates R knee pain 10/10 this morning. States that she has pain that is all over her body though, mostly right-sided but is also now on the left side (specifically points to the left hip). Pt ambulates into clinic without antalgic gait. States that a lot of her knee pain is in the back of the knee. Objective:  Modalities:   Precautions:  Exercises:  Exercise Reps/ Time Weight/ Level Comments         Nustep 5min L2          Standing:    All done bilaterally    Slant board 3x 30\"    Hip abd 10xea     Hip ext 10xea     Heel raises 10x

## 2023-05-22 ENCOUNTER — HOSPITAL ENCOUNTER (OUTPATIENT)
Dept: PHYSICAL THERAPY | Age: 33
Setting detail: THERAPIES SERIES
Discharge: HOME | End: 2023-05-22
Payer: COMMERCIAL

## 2023-05-22 NOTE — FLOWSHEET NOTE
[x] [facility]) - [facility] &  Therapy  [address]    [phone]  F: [phone]   [] [address]  [address]   [address]  P: [phone]  F: [phone]  [] [place]  [address]  P: [phone]  F: [phone] [] [address]  P: [phone]  F: [phone]  [] [address]  [facility]   [address]   [place]: [phone]  F: [phone]   [] [place] [id number] [facility] [address]  [place]: [phone]   F: [phone]     Physical Therapy Cancel/No Show note    Date: 2023  Patient: Major Jayashree  : 1990  MRN: [id number]    Cancels/No Shows to date:     For today's appointment patient:    [x]  Cancelled    [x] Rescheduled appointment    [] No-show     Reason given by patient:    []  Patient ill    []  Conflicting appointment    [] No transportation      [] Conflict with work    [] No reason given    [] Weather related    [] COVID-19    [x] Other:      Comments:  Patient called at 1:15pm for her 1pm khadra't stating she thought her khadra't was at [address].  She then r/s for 23.       [x] Next appointment was confirmed    Electronically signed by: Huma Augustin PTA

## 2023-05-30 NOTE — PROGRESS NOTES
amplified detection    Lipid Panel    CBC with Auto Differential      2. Women's annual routine gynecological examination  PAP Smear    Vaginitis DNA Probe    Chlamydia Trachomatis & Neisseria gonorrhoeae (GC) by amplified detection    Lipid Panel    CBC with Auto Differential      3. Dysmenorrhea  US PELVIS COMPLETE NON-OB TRANSABDOMINAL AND TRANSVAGINAL      4. Class 2 obesity without serious comorbidity with body mass index (BMI) of 37.0 to 37.9 in adult, unspecified obesity type  Hemoglobin A1C      5. Possible exposure to STD  HIV Screen    T. Pallidum Ab    Hepatitis C Antibody    Hepatitis B Surface Antigen      6. Breast pain, right  ROSHAN DIGITAL DIAGNOSTIC W OR WO CAD BILATERAL      7. Hidradenitis suppurativa          Return in about 2 weeks (around 6/14/2023) for follow up. PLAN:  - Pap collected per ASCCP Guidelines and sent for co-testing.   - Continue Depo. Pelvic US ordered for RLQ pain. - Clindamycin for hidradenitis. -Diagnostic mammogram for right breast pain. - Diet and exercise reviewed. Referral to PCP for weight loss medication. Lipid panel, CBC and HgA1c ordered as well. -H/o STDs: ordered vaginitis probe, GC/CT, and HIV, syphilis, hepatitis B/C.   - Routine health maintenance per patient's PCP.     Electronically signed by Jayna Galindo MD on 5/30/23 at 10:43 AM T  [facility]

## 2023-05-31 ENCOUNTER — HOSPITAL ENCOUNTER (OUTPATIENT)
Age: 33
Setting detail: SPECIMEN
Discharge: HOME | End: 2023-05-31

## 2023-05-31 ENCOUNTER — OFFICE VISIT (OUTPATIENT)
Dept: OBGYN | Age: 33
End: 2023-05-31
Payer: COMMERCIAL

## 2023-05-31 VITALS
DIASTOLIC BLOOD PRESSURE: 77 MMHG | SYSTOLIC BLOOD PRESSURE: 114 MMHG | WEIGHT: 214 LBS | HEART RATE: 91 BPM | BODY MASS INDEX: 37.91 KG/M2

## 2023-05-31 DIAGNOSIS — Z20.2 POSSIBLE EXPOSURE TO STD: ICD-10-CM

## 2023-05-31 DIAGNOSIS — N64.4 BREAST PAIN, RIGHT: ICD-10-CM

## 2023-05-31 DIAGNOSIS — E66.9 CLASS 2 OBESITY WITHOUT SERIOUS COMORBIDITY WITH BODY MASS INDEX (BMI) OF 37.0 TO 37.9 IN ADULT, UNSPECIFIED OBESITY TYPE: ICD-10-CM

## 2023-05-31 DIAGNOSIS — L73.2 HIDRADENITIS SUPPURATIVA: ICD-10-CM

## 2023-05-31 DIAGNOSIS — Z01.419 WOMEN'S ANNUAL ROUTINE GYNECOLOGICAL EXAMINATION: ICD-10-CM

## 2023-05-31 DIAGNOSIS — Z76.89 ENCOUNTER TO ESTABLISH CARE: Primary | ICD-10-CM

## 2023-05-31 DIAGNOSIS — N94.6 DYSMENORRHEA: ICD-10-CM

## 2023-05-31 DIAGNOSIS — Z76.89 ENCOUNTER TO ESTABLISH CARE: ICD-10-CM

## 2023-05-31 LAB
BASOPHILS # BLD: 0.03 K/UL (ref 0–0.2)
BASOPHILS NFR BLD: 0 % (ref 0–2)
CANDIDA SPECIES, DNA PROBE: NEGATIVE
CHOLEST SERPL-MCNC: 99 MG/DL
CHOLESTEROL/HDL RATIO: 3
EOSINOPHIL # BLD: 0.04 K/UL (ref 0–0.44)
EOSINOPHILS RELATIVE PERCENT: 1 % (ref 1–4)
ERYTHROCYTE [DISTWIDTH] IN BLOOD BY AUTOMATED COUNT: 13.4 % (ref 11.8–14.4)
EST. AVERAGE GLUCOSE BLD GHB EST-MCNC: 108 MG/DL
GARDNERELLA VAGINALIS, DNA PROBE: NEGATIVE
HBA1C MFR BLD: 5.4 % (ref 4–6)
HBV SURFACE AG SERPL QL IA: NONREACTIVE
HCT VFR BLD AUTO: 39.4 % (ref 36.3–47.1)
HCV AB SERPL QL IA: NONREACTIVE
HDLC SERPL-MCNC: 33 MG/DL
HGB BLD-MCNC: 12.5 G/DL (ref 11.9–15.1)
HIV 1+2 AB+HIV1 P24 AG SERPL QL IA: NONREACTIVE
IMM GRANULOCYTES # BLD AUTO: <0.03 K/UL (ref 0–0.3)
IMM GRANULOCYTES NFR BLD: 0 %
LDLC SERPL CALC-MCNC: 38 MG/DL (ref 0–130)
LYMPHOCYTES # BLD: 36 % (ref 24–43)
LYMPHOCYTES NFR BLD: 2.69 K/UL (ref 1.1–3.7)
MCH RBC QN AUTO: 27.5 PG (ref 25.2–33.5)
MCHC RBC AUTO-ENTMCNC: 31.7 G/DL (ref 28.4–34.8)
MCV RBC AUTO: 86.6 FL (ref 82.6–102.9)
MONOCYTES NFR BLD: 0.5 K/UL (ref 0.1–1.2)
MONOCYTES NFR BLD: 7 % (ref 3–12)
NEUTROPHILS NFR BLD: 56 % (ref 36–65)
NEUTS SEG NFR BLD: 4.24 K/UL (ref 1.5–8.1)
NRBC AUTOMATED: 0 PER 100 WBC
PLATELET # BLD AUTO: 323 K/UL (ref 138–453)
PMV BLD AUTO: 10.1 FL (ref 8.1–13.5)
RBC # BLD AUTO: 4.55 M/UL (ref 3.95–5.11)
SOURCE: NORMAL
T PALLIDUM AB SER QL IA: NONREACTIVE
TRICHOMONAS VAGINALIS DNA: NEGATIVE
TRIGL SERPL-MCNC: 138 MG/DL
WBC OTHER # BLD: 7.5 K/UL (ref 3.5–11.3)

## 2023-05-31 PROCEDURE — G8427 DOCREV CUR MEDS BY ELIG CLIN: HCPCS | Performed by: OBSTETRICS & GYNECOLOGY

## 2023-05-31 PROCEDURE — G8417 CALC BMI ABV UP PARAM F/U: HCPCS | Performed by: OBSTETRICS & GYNECOLOGY

## 2023-05-31 PROCEDURE — 4004F PT TOBACCO SCREEN RCVD TLK: CPT | Performed by: OBSTETRICS & GYNECOLOGY

## 2023-05-31 PROCEDURE — 99385 PREV VISIT NEW AGE 18-39: CPT | Performed by: OBSTETRICS & GYNECOLOGY

## 2023-05-31 PROCEDURE — 99211 OFF/OP EST MAY X REQ PHY/QHP: CPT | Performed by: OBSTETRICS & GYNECOLOGY

## 2023-05-31 RX ORDER — CLINDAMYCIN PHOSPHATE 10 MG/G
GEL TOPICAL
Qty: 1 EACH | Refills: 4 | Status: SHIPPED | OUTPATIENT
Start: 2023-05-31 | End: 2023-06-07

## 2023-05-31 RX ORDER — MEDROXYPROGESTERONE ACETATE 150 MG/ML
150 INJECTION, SUSPENSION INTRAMUSCULAR
Qty: 1 ML | Refills: 3 | Status: SHIPPED | OUTPATIENT
Start: 2023-05-31

## 2023-05-31 ASSESSMENT — ENCOUNTER SYMPTOMS
VOMITING: 0
COUGH: 0
CONSTIPATION: 0
NAUSEA: 0
ABDOMINAL PAIN: 0
DIARRHEA: 0
WHEEZING: 0

## 2023-06-01 ENCOUNTER — HOSPITAL ENCOUNTER (OUTPATIENT)
Dept: PHYSICAL THERAPY | Age: 33
Setting detail: THERAPIES SERIES
Discharge: HOME | End: 2023-06-01

## 2023-06-01 LAB
C TRACH DNA SPEC QL PROBE+SIG AMP: NEGATIVE
N GONORRHOEA DNA SPEC QL PROBE+SIG AMP: NEGATIVE
SPECIMEN DESCRIPTION: NORMAL

## 2023-06-01 NOTE — FLOWSHEET NOTE
[x] [facility]) [facility] &  Therapy  [address]    [phone]  F: [phone]   [] [address]  [facility] [address]   [address]  P: [phone]  F: [phone]  [] [address]  [address]  P: [phone]  F: [phone] [] [address]  P: [phone]  F: [phone]  [] [address]  [address] [facility] [address]   [address]   [place]: [phone]  F: [phone]   [] [facility]  [address] [facility] [address]  [place]: [phone]   F: [phone]     Physical Therapy Cancel/No Show note    Date: 2023  Patient: Manjeet Gipson  : 1990  MRN: [id number]    Cancels/No Shows to date:     For today's appointment patient:    []  Cancelled    [] Rescheduled appointment    [x] No-show     Reason given by patient:    []  Patient ill    []  Conflicting appointment    [] No transportation      [] Conflict with work    [] No reason given    [] Weather related    [] COVID-19    [x] Other:      Comments:  Patient called 20  min after her scheduled khadra't and requested to r/s due to her birthday. Informed if she misses her next khadra't, she will be DC'd.        [x] Next appointment was confirmed    Electronically signed by: Faibán Mota PTA

## 2023-06-02 LAB
HPV SAMPLE: NORMAL
HPV, GENOTYPE 16: NOT DETECTED
HPV, GENOTYPE 18: NOT DETECTED
HPV, HIGH RISK OTHER: NOT DETECTED
HPV, INTERPRETATION: NORMAL
SPECIMEN DESCRIPTION: NORMAL

## 2023-06-06 LAB — CYTOLOGY REPORT: NORMAL

## 2023-06-09 ENCOUNTER — HOSPITAL ENCOUNTER (OUTPATIENT)
Dept: PHYSICAL THERAPY | Age: 33
Setting detail: THERAPIES SERIES
Discharge: HOME | End: 2023-06-09
Payer: COMMERCIAL

## 2023-06-09 PROCEDURE — 97110 THERAPEUTIC EXERCISES: CPT

## 2023-06-09 PROCEDURE — 97016 VASOPNEUMATIC DEVICE THERAPY: CPT

## 2023-06-09 NOTE — FLOWSHEET NOTE
grossly 5/5  ? Function: Improve LEFS to 46% functional impairment  Patient to be independent with home exercise program as demonstrated by performance with correct form without cues. LTG: (to be met in 12 treatments)  Decrease right knee and ankle pain to 4/10 on average  Increase right knee flexion to 115°  Right quads and hamstring strength 5/5  Pt will be able to walk a mile with moderate difficulty  Pt will be able to stand for one hour without difficulty  Improve LEFS to 36% impairment    Pt. Education:  [x] Yes  [] No  [x] Reviewed Prior HEP/Ed  Method of Education: [x] Verbal  [x] Demo  [] Written  Comprehension of Education:  [x] Verbalizes understanding. [x] Demonstrates understanding. [x] Needs review. [] Demonstrates/verbalizes HEP/Ed previously given. Plan: [x] Continue current frequency toward long and short term goals.     [x] Specific Instructions for subsequent treatments: R knee strengthening of the quads and hamstrings; R hip strengthening; R ankle ROM and strengthening       Time In: 9:00 am            Time Out: 9:54 am    Electronically signed by:  Katharine Garcia PTA

## 2023-06-19 ENCOUNTER — HOSPITAL ENCOUNTER (OUTPATIENT)
Dept: PHYSICAL THERAPY | Age: 33
Setting detail: THERAPIES SERIES
Discharge: HOME | End: 2023-06-19
Payer: COMMERCIAL

## 2023-06-19 PROCEDURE — 97110 THERAPEUTIC EXERCISES: CPT

## 2023-06-19 NOTE — FLOWSHEET NOTE
[x] [facility]) [facility] &  Therapy  [address]  [phone]  F: [phone] [] [address]  [place] [address]   [address]  P: [phone]  F: [phone] [] [address]  [address]  P: [phone]  F: [phone] [] [address]  P: [phone]  F: [phone] [] [address]  [facility]   [address]   [place]: [phone]  F: [phone]      Physical Therapy Daily Treatment Note    Date:  2023  Patient Name:  Vijaya Pacheco    :  1990  MRN: [id number]  Physician: Al Pearson DO                                    Insurance: Megan Villalobos ( visits)  Medical Diagnosis: Patellofemoral arthralgia of right knee (M25.561 [ICD-10-CM]) *order says to also strengthen the right ankle                         Rehab Codes: M25.561, M25.571, M25.661, M25.671, M62.81, R26.2  Onset date: 20               Next 's appt.: 6.21.23  Visit# / total visits:                                    Cancels/No Shows: /2    Subjective:    Pain:  [x] Yes  [] No Location: R knee, R ankle Pain Rating: (0-10 scale) 7/10  Pain altered Tx:  [x] No  [] Yes  Action:  Comments:  Patient arrived 15 min late. Pt reported no change in pain level or location      Objective:  Modalities: vaso compression to R knee Mod pressure 38 deg x15 min  Precautions:  Exercises:  Exercise Reps/ Time Weight/ Level Comments         Nustep   Held due to late arrival         Standing:    All done bilaterally    Slant board 3x 30\"    Hip abd 20xea 1 lbs    Hip ext 10x2ea 1 lbs    Heel raises 10x2 1 lbs    Toe raises 10x2 1 lbs    Hamstring curls 10x2 1 lbs    Hip flex 10x2 1 lbs    TG squats w/ball 2x10 Level 35          Mat:      LAQ 10x2 ea 2 lbs ball

## 2023-06-21 ENCOUNTER — SCHEDULED TELEPHONE ENCOUNTER (OUTPATIENT)
Dept: OBGYN | Age: 33
End: 2023-06-21

## 2023-06-21 ENCOUNTER — OFFICE VISIT (OUTPATIENT)
Dept: ORTHOPEDIC SURGERY | Age: 33
End: 2023-06-21

## 2023-06-21 VITALS — WEIGHT: 214 LBS | HEIGHT: 63 IN | BODY MASS INDEX: 37.92 KG/M2

## 2023-06-21 DIAGNOSIS — Z09 FOLLOW-UP EXAM: ICD-10-CM

## 2023-06-21 DIAGNOSIS — M67.921 TENDINOPATHY OF RIGHT BICEPS TENDON: Primary | ICD-10-CM

## 2023-06-21 DIAGNOSIS — R52 PAIN: ICD-10-CM

## 2023-06-21 DIAGNOSIS — Z98.890 HISTORY OF COLPOSCOPY WITH CERVICAL BIOPSY: Primary | ICD-10-CM

## 2023-06-21 RX ORDER — BUPIVACAINE HYDROCHLORIDE 2.5 MG/ML
0.5 INJECTION, SOLUTION INFILTRATION; PERINEURAL ONCE
Status: COMPLETED | OUTPATIENT
Start: 2023-06-21 | End: 2023-06-22

## 2023-06-21 RX ORDER — LIDOCAINE HYDROCHLORIDE 10 MG/ML
0.5 INJECTION, SOLUTION INFILTRATION; PERINEURAL ONCE
Status: COMPLETED | OUTPATIENT
Start: 2023-06-21 | End: 2023-06-22

## 2023-06-21 RX ORDER — METHYLPREDNISOLONE ACETATE 80 MG/ML
80 INJECTION, SUSPENSION INTRA-ARTICULAR; INTRALESIONAL; INTRAMUSCULAR; SOFT TISSUE ONCE
Status: COMPLETED | OUTPATIENT
Start: 2023-06-21 | End: 2023-06-22

## 2023-06-21 NOTE — PROGRESS NOTES
Matthew Thakkar is a 28 y.o. female evaluated via telephone on 2023 for Results ([place] Results)    Name and  verified prior to conversation  Documentation:  I communicated with the patient and/or health care decision maker about colposcopy results. Details of this discussion including any medical advice provided: patient advised that biopsy from colposcopy and endometrial biopsy was negative for any atypical or malignant cells. She was instructed to call the office in one year for repeat PAP smear with cotesting for HPV per ASCCP guidelines. All questions answered and patient expressed understanding. Total Time: minutes: 5-10 minutes    Matthew Thakkar was evaluated through a synchronous (real-time) audio encounter. Patient identification was verified at the start of the visit. She (or guardian if applicable) is aware that this is a billable service, which includes applicable co-pays. This visit was conducted with the patient's (and/or legal guardian's) verbal consent. She has not had a related appointment within my department in the past 7 days or scheduled within the next 24 hours. The patient was located at Home: [place] [address]. The provider was located at [facility] (Appt Dept): [address],  [address] [place] [address].     Note: not billable if this call serves to triage the patient into an appointment for the relevant concern    Chuck Salomon MD
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
## 2023-06-22 ENCOUNTER — NURSE ONLY (OUTPATIENT)
Dept: OBGYN | Age: 33
End: 2023-06-22
Payer: COMMERCIAL

## 2023-06-22 VITALS — WEIGHT: 230 LBS | BODY MASS INDEX: 40.74 KG/M2

## 2023-06-22 DIAGNOSIS — N92.6 MISSED PERIOD: Primary | ICD-10-CM

## 2023-06-22 DIAGNOSIS — N94.6 DYSMENORRHEA: ICD-10-CM

## 2023-06-22 LAB
CONTROL: NORMAL
PREGNANCY TEST URINE, POC: NORMAL

## 2023-06-22 PROCEDURE — 81025 URINE PREGNANCY TEST: CPT | Performed by: OBSTETRICS & GYNECOLOGY

## 2023-06-22 PROCEDURE — 96372 THER/PROPH/DIAG INJ SC/IM: CPT | Performed by: OBSTETRICS & GYNECOLOGY

## 2023-06-22 RX ORDER — MEDROXYPROGESTERONE ACETATE 150 MG/ML
150 INJECTION, SUSPENSION INTRAMUSCULAR ONCE
Status: COMPLETED | OUTPATIENT
Start: 2023-06-22 | End: 2023-06-22

## 2023-06-22 RX ADMIN — MEDROXYPROGESTERONE ACETATE 150 MG: 150 INJECTION, SUSPENSION INTRAMUSCULAR at 15:39

## 2023-06-22 RX ADMIN — METHYLPREDNISOLONE ACETATE 80 MG: 80 INJECTION, SUSPENSION INTRA-ARTICULAR; INTRALESIONAL; INTRAMUSCULAR; SOFT TISSUE at 10:09

## 2023-06-22 RX ADMIN — LIDOCAINE HYDROCHLORIDE 0.5 ML: 10 INJECTION, SOLUTION INFILTRATION; PERINEURAL at 10:08

## 2023-06-22 RX ADMIN — BUPIVACAINE HYDROCHLORIDE 1.25 MG: 2.5 INJECTION, SOLUTION INFILTRATION; PERINEURAL at 10:08

## 2023-06-22 NOTE — PROGRESS NOTES
Patient presents to office for Depo Provera injection. Per doctor's orders of Depo Provera 150mg given IM, Right Deltoid, patient tolerated well. Patient advised to return in 11-12 weeks for next injection.     NDC# 82018-333-84  LOT# [id number]  Exp date- [id number]

## 2023-06-22 NOTE — PROGRESS NOTES
Attending Physician Statement  I have discussed the care of [facility], including pertinent history and exam findings,  with the resident. I have reviewed the key elements of all parts of the encounter with the resident. I agree with the assessment, plan and orders as documented by the resident.   (GE Modifier)    Tristen Benites,

## 2023-06-27 ENCOUNTER — HOSPITAL ENCOUNTER (OUTPATIENT)
Dept: PHYSICAL THERAPY | Age: 33
Setting detail: THERAPIES SERIES
Discharge: HOME | End: 2023-06-27
Payer: COMMERCIAL

## 2023-06-27 PROCEDURE — 97110 THERAPEUTIC EXERCISES: CPT

## 2023-07-06 ENCOUNTER — HOSPITAL ENCOUNTER (OUTPATIENT)
Dept: PHYSICAL THERAPY | Age: 33
Setting detail: THERAPIES SERIES
End: 2023-07-06
Payer: COMMERCIAL

## 2023-07-11 ENCOUNTER — HOSPITAL ENCOUNTER (OUTPATIENT)
Dept: PHYSICAL THERAPY | Age: 33
Setting detail: THERAPIES SERIES
Discharge: HOME | End: 2023-07-11
Payer: COMMERCIAL

## 2023-07-11 PROCEDURE — 97110 THERAPEUTIC EXERCISES: CPT

## 2023-07-11 PROCEDURE — 97161 PT EVAL LOW COMPLEX 20 MIN: CPT

## 2023-07-13 ENCOUNTER — HOSPITAL ENCOUNTER (OUTPATIENT)
Dept: PHYSICAL THERAPY | Age: 33
Setting detail: THERAPIES SERIES
Discharge: HOME | End: 2023-07-13
Payer: COMMERCIAL

## 2023-07-13 NOTE — FLOWSHEET NOTE
[x] [facility] ([facility]) - [facility] &  Therapy  [address]    [phone]  F: [phone]   [] [address] [facility]  [address]   [address]  P: [phone]  F: [phone]  [] [address] &  Therapy  [address] [facility]  P: [phone]  F: [phone] [] Raad Dayan  P: [phone]  F: [phone]  [] [address] [facility]   [address]   [place]: [phone]  F: [phone]   [] [address] [facility] [place] [place]  [address]  [place]: [phone]   F: [phone]     Physical Therapy Cancel/No Show note    Date: 2023  Patient: Chanel Gu  : 1990  MRN: [id number]    Cancels/No Shows to date:     For today's appointment patient:    []  Cancelled    [] Rescheduled appointment    [x] No-show     Reason given by patient:    []  Patient ill    []  Conflicting appointment    [] No transportation      [] Conflict with work    [] No reason given    [] Weather related    [] [id number]    [] Other:      Comments:        [x] Next appointment was confirmed previously    Electronically signed by: Horace Trinh PTA

## 2023-07-17 ENCOUNTER — HOSPITAL ENCOUNTER (OUTPATIENT)
Dept: PHYSICAL THERAPY | Age: 33
Setting detail: THERAPIES SERIES
Discharge: HOME | End: 2023-07-17
Payer: COMMERCIAL

## 2023-07-17 NOTE — FLOWSHEET NOTE
[x] [facility] ([facility]) - [facility] &  Therapy  [address] [facility].    [phone]  F: [phone]   [] [address] [facility]  [address]   [address]  P: [phone]  F: [phone]  [] [address]  [address]  P: [phone]  F: [phone] [] [facility]  P: [phone]  F: [phone]  [] [address]   [address]   [place]: [phone]  F: [phone]   [] [facility]  [address]  [place]: [phone]   F: [phone]     Physical Therapy Cancel/No Show note    Date: 2023  Patient: Alex Covington  : 1990  MRN: [id number]    Cancels/No Shows to date:     Cancelled 23 and 23    For today's appointment patient:    [x]  Cancelled    [] Rescheduled appointment    [] No-show     Reason given by patient:    []  Patient ill    []  Conflicting appointment    [] No transportation      [] Conflict with work    [] No reason given    [] Weather related    [] COVID-19    [x] Other: started new job and unable to keep appointment     Comments:  States she will call back and reschedule      [] Next appointment was confirmed     Electronically signed by: Ronald Bazzi PTA

## 2023-07-20 ENCOUNTER — APPOINTMENT (OUTPATIENT)
Dept: PHYSICAL THERAPY | Age: 33
End: 2023-07-20
Payer: COMMERCIAL

## 2023-07-28 ENCOUNTER — TELEPHONE (OUTPATIENT)
Dept: ORTHOPEDIC SURGERY | Age: 33
End: 2023-07-28

## 2023-07-28 ENCOUNTER — HOSPITAL ENCOUNTER (OUTPATIENT)
Dept: PHYSICAL THERAPY | Age: 33
Setting detail: THERAPIES SERIES
Discharge: HOME | End: 2023-07-28
Payer: COMMERCIAL

## 2023-07-28 NOTE — FLOWSHEET NOTE
[x] [facility] ([facility]) - [facility] &  Therapy  [address] [facility] [place] [facility].    [phone]  F: [phone]   [] [address] [facility]  [address]   [address]  P: [phone]  F: [phone]  [] [address] &  Therapy  [address] [facility]  P: [phone]  F: [phone] [] Raad Dayan  P: [phone]  F: [phone]  [] [address]  [address] [facility] [address]   [address]   [place]: [phone]  F: [phone]   [] 97 [facility]  [address]  [place]: [phone]   F: [phone]     Physical Therapy Cancel/No Show note    Date: 2023  Patient: Robert Guerin  : 1990  MRN: [id number]    Cancels/No Shows to date:     For today's appointment patient:    [x]  Cancelled    [] Rescheduled appointment    [] No-show     Reason given by patient:    []  Patient ill    []  Conflicting appointment    [] No transportation      [] Conflict with work    [] No reason given    [] Weather related    [] COVID-19    [x] Other:      Comments:  Patient called and left voicemail to cancel her apt today and all future visits. Primary therapist was notified.        [] Next appointment was confirmed    Electronically signed by: Eloy Hilton PT

## 2023-07-28 NOTE — TELEPHONE ENCOUNTER
Pt called stating she has to cancel PT d/t work and transportation.   She can't say when she will resume PT.

## 2023-09-07 RX ORDER — MEDROXYPROGESTERONE ACETATE 150 MG/ML
150 INJECTION, SUSPENSION INTRAMUSCULAR
Qty: 1 ML | Refills: 3 | Status: SHIPPED | OUTPATIENT
Start: 2023-09-07

## 2023-09-12 ENCOUNTER — TELEPHONE (OUTPATIENT)
Dept: OBGYN | Age: 33
End: 2023-09-12

## 2023-09-12 RX ORDER — MEDROXYPROGESTERONE ACETATE 150 MG/ML
150 INJECTION, SUSPENSION INTRAMUSCULAR
Qty: 1 ML | Refills: 3 | Status: SHIPPED | OUTPATIENT
Start: 2023-09-12
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
## 2023-09-12 NOTE — TELEPHONE ENCOUNTER
Pts appointment has been rescheduled to 9/13.   She will pick it up on her way in to her appointment

## 2023-09-13 ENCOUNTER — NURSE ONLY (OUTPATIENT)
Dept: OBGYN | Age: 33
End: 2023-09-13

## 2023-09-13 ENCOUNTER — OFFICE VISIT (OUTPATIENT)
Dept: ORTHOPEDIC SURGERY | Age: 33
End: 2023-09-13

## 2023-09-13 VITALS — HEIGHT: 63 IN | WEIGHT: 212 LBS | BODY MASS INDEX: 37.56 KG/M2

## 2023-09-13 DIAGNOSIS — N94.6 DYSMENORRHEA: Primary | ICD-10-CM

## 2023-09-13 DIAGNOSIS — R52 PAIN: ICD-10-CM

## 2023-09-13 DIAGNOSIS — M15.1 DEGENERATIVE ARTHRITIS OF DISTAL INTERPHALANGEAL JOINT OF RING FINGER OF RIGHT HAND: Primary | ICD-10-CM

## 2023-09-13 RX ORDER — MEDROXYPROGESTERONE ACETATE 150 MG/ML
150 INJECTION, SUSPENSION INTRAMUSCULAR ONCE
Status: COMPLETED | OUTPATIENT
Start: 2023-09-13 | End: 2023-09-13

## 2023-09-13 RX ADMIN — MEDROXYPROGESTERONE ACETATE 150 MG: 150 INJECTION, SUSPENSION INTRAMUSCULAR at 10:30

## 2023-09-13 NOTE — PROGRESS NOTES
Patient was given Depo in the Left Ventrogluteal . Per doctor [facility]# [id number]  LOT# [id number]  Exp date- 09/30/2024  Patient tolerated well without difficulty
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
## 2023-11-20 NOTE — PROGRESS NOTES
Dermatology Patient Note  [address]  [address] [place] [address] Road [facility]  Dept: [phone]  Dept Fax: [phone]      VISITDATE: 11/22/2023   REFERRING PROVIDER: No ref. provider found      Celestina Estrella is a 28 y.o. female  who presents today in the office for:    New Patient (NP-patient presents today because she has been having scalp pain since she was 4years old. She states she pulls her hair out because of the pain, she says when she pulls the hair out it causes the pain to go away. She has been trying to not pull the hair out but the pain gets worse and spreads if she doesn't pull it out. She doesn't feel any itching just the pain. She feels like the hair is going back into the pores so they need to come out. )      HISTORY OF PRESENT ILLNESS:  As above. New patient presents for evaluation of chronic scalp pain, which causes her to pull her hair out. Patient explains that the pain is excruciating in the middle of her scalp, and pulling the hair out will relieve the pain. The pain is sharp. Patient states that she feels like she has to pull the hairs out. This has been ongoing since age 4. The pt has been diagnosed with anxiety. Patient also notes concern about \"boils\" in her bilateral axilla. They become inflamed and painful. She has also experienced them in her groin in the past.       MEDICAL PROBLEMS:  There are no problems to display for this patient.       CURRENT MEDICATIONS:   Current Outpatient Medications   Medication Sig Dispense Refill    busPIRone (BUSPAR) 5 MG tablet Take 1 tablet by mouth 3 times daily      FLUoxetine (PROZAC) 10 MG capsule Take 1 capsule by mouth daily      loratadine (CLARITIN) 10 MG tablet Take 1 tablet by mouth daily      ferrous sulfate (IRON 325) 325 (65 Fe) MG tablet Take 1 tablet by mouth daily (with breakfast)      medroxyPROGESTERone (DEPO-PROVERA) 150 MG/ML

## 2023-11-22 ENCOUNTER — OFFICE VISIT (OUTPATIENT)
Dept: DERMATOLOGY | Age: 33
End: 2023-11-22
Payer: COMMERCIAL

## 2023-11-22 VITALS
DIASTOLIC BLOOD PRESSURE: 89 MMHG | OXYGEN SATURATION: 98 % | TEMPERATURE: 97.7 F | SYSTOLIC BLOOD PRESSURE: 117 MMHG | HEIGHT: 63 IN | BODY MASS INDEX: 38.27 KG/M2 | WEIGHT: 216 LBS | HEART RATE: 87 BPM

## 2023-11-22 DIAGNOSIS — L73.2 HIDRADENITIS SUPPURATIVA: ICD-10-CM

## 2023-11-22 DIAGNOSIS — F63.3 TRICHOTILLOMANIA: Primary | ICD-10-CM

## 2023-11-22 PROCEDURE — 4004F PT TOBACCO SCREEN RCVD TLK: CPT | Performed by: PHYSICIAN ASSISTANT

## 2023-11-22 PROCEDURE — G8417 CALC BMI ABV UP PARAM F/U: HCPCS | Performed by: PHYSICIAN ASSISTANT

## 2023-11-22 PROCEDURE — 99204 OFFICE O/P NEW MOD 45 MIN: CPT | Performed by: PHYSICIAN ASSISTANT

## 2023-11-22 PROCEDURE — G8427 DOCREV CUR MEDS BY ELIG CLIN: HCPCS | Performed by: PHYSICIAN ASSISTANT

## 2023-11-22 PROCEDURE — G8484 FLU IMMUNIZE NO ADMIN: HCPCS | Performed by: PHYSICIAN ASSISTANT

## 2023-11-22 RX ORDER — CLINDAMYCIN PHOSPHATE 10 UG/ML
LOTION TOPICAL
Qty: 60 ML | Refills: 3 | Status: SHIPPED | OUTPATIENT
Start: 2023-11-22

## 2023-11-22 RX ORDER — FLUOXETINE 10 MG/1
10 CAPSULE ORAL DAILY
COMMUNITY
Start: 2023-10-05

## 2023-11-22 RX ORDER — CLOBETASOL PROPIONATE 0.46 MG/ML
SOLUTION TOPICAL
Qty: 50 ML | Refills: 2 | Status: SHIPPED | OUTPATIENT
Start: 2023-11-22

## 2023-11-22 RX ORDER — FERROUS SULFATE 325(65) MG
325 TABLET ORAL
COMMUNITY

## 2023-11-22 RX ORDER — DOXYCYCLINE HYCLATE 100 MG/1
CAPSULE ORAL
Qty: 60 CAPSULE | Refills: 2 | Status: SHIPPED | OUTPATIENT
Start: 2023-11-22

## 2023-11-22 RX ORDER — LORATADINE 10 MG/1
10 TABLET ORAL DAILY
COMMUNITY
Start: 2023-10-05

## 2023-11-22 RX ORDER — BUSPIRONE HYDROCHLORIDE 5 MG/1
5 TABLET ORAL 3 TIMES DAILY
COMMUNITY
Start: 2023-10-05

## 2023-11-30 ENCOUNTER — TELEPHONE (OUTPATIENT)
Dept: ORTHOPEDIC SURGERY | Age: 33
End: 2023-11-30

## 2023-11-30 NOTE — TELEPHONE ENCOUNTER
Pt called in stating two days ago pt was walking her dog and fell on her left hip. Pt is having trouble walking and is in pain.

## 2023-11-30 NOTE — TELEPHONE ENCOUNTER
Returned call to patient. No answer. Left [facility] for patient to call the office and make an appointment.

## 2023-12-05 ENCOUNTER — NURSE ONLY (OUTPATIENT)
Dept: OBGYN | Age: 33
End: 2023-12-05
Payer: COMMERCIAL

## 2023-12-05 DIAGNOSIS — N94.6 DYSMENORRHEA: Primary | ICD-10-CM

## 2023-12-05 PROCEDURE — 96372 THER/PROPH/DIAG INJ SC/IM: CPT | Performed by: STUDENT IN AN ORGANIZED HEALTH CARE EDUCATION/TRAINING PROGRAM

## 2023-12-05 RX ORDER — MEDROXYPROGESTERONE ACETATE 150 MG/ML
150 INJECTION, SUSPENSION INTRAMUSCULAR ONCE
Status: COMPLETED | OUTPATIENT
Start: 2023-12-05 | End: 2023-12-05

## 2023-12-05 RX ADMIN — MEDROXYPROGESTERONE ACETATE 150 MG: 150 INJECTION, SUSPENSION INTRAMUSCULAR at 08:53

## 2023-12-05 NOTE — PROGRESS NOTES
After obtaining consent and per orders of Dr. José Ho, Injection of Medroxyprogesterone 150 mg given right ventrogluteal.  Patient tolerated injection well. She was instructed to remain in the clinic for 20 minutes and to report any adverse reaction immediately.     [address] [id number]  Exp 04-01-25

## 2024-01-08 ENCOUNTER — INITIAL CONSULT (OUTPATIENT)
Dept: PHYSICAL MEDICINE AND REHAB | Age: 34
End: 2024-01-08
Payer: COMMERCIAL

## 2024-01-08 VITALS
TEMPERATURE: 98.4 F | WEIGHT: 209.2 LBS | HEART RATE: 78 BPM | DIASTOLIC BLOOD PRESSURE: 78 MMHG | BODY MASS INDEX: 37.06 KG/M2 | SYSTOLIC BLOOD PRESSURE: 108 MMHG

## 2024-01-08 DIAGNOSIS — M54.50 CHRONIC BILATERAL LOW BACK PAIN WITHOUT SCIATICA: Primary | ICD-10-CM

## 2024-01-08 DIAGNOSIS — G89.29 CHRONIC PAIN OF RIGHT KNEE: ICD-10-CM

## 2024-01-08 DIAGNOSIS — M54.2 CHRONIC NECK PAIN: ICD-10-CM

## 2024-01-08 DIAGNOSIS — G89.29 CHRONIC NECK PAIN: ICD-10-CM

## 2024-01-08 DIAGNOSIS — M54.6 CHRONIC RIGHT-SIDED THORACIC BACK PAIN: ICD-10-CM

## 2024-01-08 DIAGNOSIS — G89.29 CHRONIC RIGHT-SIDED THORACIC BACK PAIN: ICD-10-CM

## 2024-01-08 DIAGNOSIS — G89.29 CHRONIC BILATERAL LOW BACK PAIN WITHOUT SCIATICA: Primary | ICD-10-CM

## 2024-01-08 DIAGNOSIS — M25.561 CHRONIC PAIN OF RIGHT KNEE: ICD-10-CM

## 2024-01-08 PROCEDURE — G8484 FLU IMMUNIZE NO ADMIN: HCPCS | Performed by: STUDENT IN AN ORGANIZED HEALTH CARE EDUCATION/TRAINING PROGRAM

## 2024-01-08 PROCEDURE — G8427 DOCREV CUR MEDS BY ELIG CLIN: HCPCS | Performed by: STUDENT IN AN ORGANIZED HEALTH CARE EDUCATION/TRAINING PROGRAM

## 2024-01-08 PROCEDURE — 99244 OFF/OP CNSLTJ NEW/EST MOD 40: CPT | Performed by: STUDENT IN AN ORGANIZED HEALTH CARE EDUCATION/TRAINING PROGRAM

## 2024-01-08 PROCEDURE — G8417 CALC BMI ABV UP PARAM F/U: HCPCS | Performed by: STUDENT IN AN ORGANIZED HEALTH CARE EDUCATION/TRAINING PROGRAM

## 2024-01-08 RX ORDER — NORTRIPTYLINE HYDROCHLORIDE 10 MG/1
CAPSULE ORAL
Qty: 60 CAPSULE | Refills: 1 | Status: SHIPPED | OUTPATIENT
Start: 2024-01-08

## 2024-01-08 NOTE — PROGRESS NOTES
[facility] PHYSICAL MEDICINE AND REHABILITATION  [address] [place] [address]  [place] [address]  Dept: [phone]  Dept Fax: [phone]    New Patient Note    Maribel Coleman is a 33 y.o.-year-old female presenting with chronic, diffuse body pain.     HPI:     She reports constant, chronic, diffuse body pain but states that the worst pain is in the back and right lower limb, particularly the knee.  She notes that pain can vary in character - mostly throbbing, cramping, or tightness.  She often feels like she needs to \"crack\" her joints, which provides some temporary relief.  She states that back pain started after childbirth in 2008 and has been worsening since that time.  She describes pain in the low back but also the mid-back, which wraps around to the abdomen on the right side.  She also notes neck pain.  She reports numbness/tingling at the right buttock and hip as well as the distal right lower limb.  She also notes weakness in the right lower limb and states that it \"goes out\" intermittently (occurs every day).  She feels like she has to compensate with the left lower limb.    She reports that right knee pain has been present for about 3 years and worsened after a fall in November 2023.  She also notes edema around the right knee since that time.  She states that squatting makes the pain worse.  Overall, she reports that her chronic pain wakes her up at night and interferes with her sleep.  She feels like she can only sleep in a \"fetal position\" due to pain.  She states that getting out of bed, standing, walking, and climbing stairs are challenging with her pain.    She notes that ice, over-the-counter analgesics, and topical medications help some with pain.  She reports that flexeril and soma also helped in the past.  She has had an injection in the right shoulder in the past, which she states helped for 2 days.  She also notes using a knee brace and

## 2024-01-24 ENCOUNTER — HOSPITAL ENCOUNTER (OUTPATIENT)
Age: 34
Discharge: HOME | End: 2024-01-26
Payer: COMMERCIAL

## 2024-01-24 ENCOUNTER — HOSPITAL ENCOUNTER (OUTPATIENT)
Dept: GENERAL RADIOLOGY | Age: 34
Discharge: HOME | End: 2024-01-26
Payer: COMMERCIAL

## 2024-01-24 DIAGNOSIS — G89.29 CHRONIC RIGHT-SIDED THORACIC BACK PAIN: ICD-10-CM

## 2024-01-24 DIAGNOSIS — M54.50 CHRONIC BILATERAL LOW BACK PAIN WITHOUT SCIATICA: ICD-10-CM

## 2024-01-24 DIAGNOSIS — G89.29 CHRONIC NECK PAIN: ICD-10-CM

## 2024-01-24 DIAGNOSIS — M54.6 CHRONIC RIGHT-SIDED THORACIC BACK PAIN: ICD-10-CM

## 2024-01-24 DIAGNOSIS — M54.2 CHRONIC NECK PAIN: ICD-10-CM

## 2024-01-24 DIAGNOSIS — G89.29 CHRONIC BILATERAL LOW BACK PAIN WITHOUT SCIATICA: ICD-10-CM

## 2024-01-24 PROCEDURE — 72100 X-RAY EXAM L-S SPINE 2/3 VWS: CPT

## 2024-01-24 PROCEDURE — 72040 X-RAY EXAM NECK SPINE 2-3 VW: CPT

## 2024-01-24 PROCEDURE — 72072 X-RAY EXAM THORAC SPINE 3VWS: CPT

## 2024-01-25 ENCOUNTER — HOSPITAL ENCOUNTER (OUTPATIENT)
Dept: PHYSICAL THERAPY | Age: 34
Setting detail: THERAPIES SERIES
Discharge: HOME | End: 2024-01-25
Payer: COMMERCIAL

## 2024-01-25 PROCEDURE — 97162 PT EVAL MOD COMPLEX 30 MIN: CPT

## 2024-01-25 PROCEDURE — 97110 THERAPEUTIC EXERCISES: CPT

## 2024-01-25 NOTE — CONSULTS
[x] [facility]  Outpatient Rehabilitation &  Therapy  [address]  [phone]  F: [phone]     Physical Therapy Spine Evaluation    Date:  2024  Patient: Maribel Coleman  : 1990  MRN: [id number]  Physician: Ewelina Jones MD      Insurance: [facility] (30 EACH FOR PT/OT/ST)   Medical Diagnosis: M54.50, G89.29 - Chronic bilateral low back pain without sciatica; M54.6, G89.29 - Chronic right-sided thoracic back pain; M54.2, G89.29 - Chronic neck pain    Rehab Codes: M25.56, M54.2, M54.6, M54.9, M25.60, Z74.09, M62.81   Onset Date: 2024 Next 's appt.: Return in about 2 months (around 3/8/2024).       Subjective:   CC/HPI: Patient is a 33 y.o. female who presents to outpatient physical therapy with reports of back pain, neck pain, and pain in the right LLE. Patient reports she is having numbness and tingling throughout the entire body. Patient reports that she feels the need to \"crack her bones\" to feel relief. Patient reports the she began to have the pain in the low back on the right side during  after child birth. Patient state knee pain has been present since she was 14, and states she was bow-legged, and had pain from this.     Patient reports having a fall in November that was a result of her puppy running into her leg and causing her right knee to buckle. Patient reports that she landed very hard on the left side of the body. Patient reports increased edema in the right knee following the fall.     Patient reports pain throughout all areas of the body. Patient reports having issues with Trichotillomania.     She has an MRI scheduled for the near future. This is for the right knee.    PMHx: [x] Refer to full medical chart in [facility]   [] Unremarkable [] Diabetes [] HTN  [] Pacemaker   [] MI/Heart Problems [] Cancer [] Arthritis   [] Other:     Past Medical History        Past Medical History:   Diagnosis Date    Snores       not yet tested for

## 2024-02-01 ENCOUNTER — HOSPITAL ENCOUNTER (OUTPATIENT)
Dept: PHYSICAL THERAPY | Age: 34
Setting detail: THERAPIES SERIES
Discharge: HOME | End: 2024-02-01

## 2024-02-01 NOTE — FLOWSHEET NOTE
[x] [facility]  Outpatient Rehabilitation &  Therapy  [address] [place] [address]  [phone]  F: [phone]     Therapy Cancel/No Show note    Date: 2024  Patient: Maribel Coleman  : 1990  MRN: [id number]    Cancels/No Shows to date:     For today's appointment patient:    [x]  Cancelled    [] Rescheduled appointment    [] No-show     Reason given by patient:    [] Patient ill    [] Conflicting appointment    [] No transportation      [] Conflict with work    [x] No reason given    [] Weather related    [] COVID-19    [] Other:      Comments:  Patient called to cancel today's appointment.       [] Next appointment was confirmed    Electronically signed by: Janeth Rose, PT

## 2024-02-03 ENCOUNTER — HOSPITAL ENCOUNTER (OUTPATIENT)
Dept: MRI IMAGING | Age: 34
End: 2024-02-03
Attending: STUDENT IN AN ORGANIZED HEALTH CARE EDUCATION/TRAINING PROGRAM
Payer: COMMERCIAL

## 2024-02-03 DIAGNOSIS — G89.29 CHRONIC PAIN OF RIGHT KNEE: ICD-10-CM

## 2024-02-03 DIAGNOSIS — M25.561 CHRONIC PAIN OF RIGHT KNEE: ICD-10-CM

## 2024-02-03 PROCEDURE — 73721 MRI JNT OF LWR EXTRE W/O DYE: CPT

## 2024-02-05 ENCOUNTER — HOSPITAL ENCOUNTER (OUTPATIENT)
Dept: PHYSICAL THERAPY | Age: 34
Setting detail: THERAPIES SERIES
Discharge: HOME | End: 2024-02-05

## 2024-02-05 NOTE — FLOWSHEET NOTE
[x] [facility]  Outpatient Rehabilitation &  Therapy  [address] [place] [address]  [phone]  F: [phone]     Therapy Cancel/No Show note    Date: 2024  Patient: Maribel Coleman  : 1990  MRN: [id number]    Cancels/No Shows to date:     For today's appointment patient:    [x]  Cancelled    [] Rescheduled appointment    [] No-show     Reason given by patient:    [] Patient ill    [] Conflicting appointment    [] No transportation      [] Conflict with work    [x] No reason given    [] Weather related    [] COVID-19    [] Other:      Comments:  Patient called to cancel today's appointment.       [] Next appointment was confirmed    Electronically signed by: Janeth Rose, PT

## 2024-02-07 ENCOUNTER — HOSPITAL ENCOUNTER (OUTPATIENT)
Dept: PHYSICAL THERAPY | Age: 34
Setting detail: THERAPIES SERIES
Discharge: HOME | End: 2024-02-07

## 2024-02-07 ENCOUNTER — TELEPHONE (OUTPATIENT)
Dept: PHYSICAL MEDICINE AND REHAB | Age: 34
End: 2024-02-07

## 2024-02-07 NOTE — TELEPHONE ENCOUNTER
Patient called and left voicemail inquiring about MRI R knee results. Looks like Dr. Jones reviewed the result.   Patient also notes that she went to PT eval but the exercises they had her do made her sick to her stomach. She lost her appetite and still has loss of appetite. She cancelled the next 2 visits and doesn't want to go if this is how it will make her feel.     Patient requesting a phone call re: results.  I will fwd this to Andrea to reach out to her.     Dr. Jones, please advise on patient's response to PT.  Next appt is 3/25    Thank you.

## 2024-02-07 NOTE — TELEPHONE ENCOUNTER
I called pt to inform her of the knee mri results.  She is stating that her symptoms are reaching to the left leg now because she is compensating due to the right knee pain.  She states that she is not doing the PT because it is making her sick.    She also wants a letter stating that she cannot walk up and down stairs. She lives in a third floor townhouse and wants to be moved to a lower level but cannot do with a letter stating that this is needed.    I think that she needs to be seen again to discuss everything but there are no appointments available.    She also read report and thinks that she has water on the knee and a cyst that needs to be addressed.    She also wants something to help with pain.  I did explain that we do not order pain medication but I would ask if there was something else that could be given.  She is currently using otc tylenol, aspirin, or ibuprofen.      Please advise.

## 2024-02-07 NOTE — FLOWSHEET NOTE
[x] [facility]  Outpatient Rehabilitation &  Therapy  [address]  [phone]  [phone] [] [facility]  Outpatient Rehabilitation &  Therapy  [address]  P: [phone]  F: [phone] [] [facility]  Outpatient Rehabilitation &  Therapy  [address]  P: [phone]  F: [phone] [] [facility]  Outpatient Rehabilitation &  Therapy  [address]  [phone]  [phone] [] [facility]  Outpatient Rehabilitation &  Therapy  [address]   P: [phone]  F: [phone]  [] [facility]  Outpatient Rehabilitation &  Therapy  [address]  P: [phone]  F: [phone] [] [facility]  Outpatient Rehabilitation &  Therapy  [address]  P: [phone]  F: [phone] [] [facility]  Outpatient Rehabilitation &  Therapy  [address]  P: [phone]  F: [phone] [] [facility]  Outpatient Rehabilitation &  Therapy  [address]  P: [phone]  F: [phone]  [] [facility] Outpatient Rehabilitation &  Therapy  [address]  P: [phone]  F: [phone]     Therapy Cancel/No Show note    Date: 2024  Patient: Maribel Coleman  : 1990  MRN: [id number]    Cancels/No Shows to date: 3/0    For today's appointment patient:    [x]  Cancelled, same day cx.     [] Rescheduled appointment    [] No-show     Reason given by patient:    []  Patient ill    []  Conflicting appointment    [] No transportation      [] Conflict with work    [] No reason given    [] Weather related    [] COVID-19    [x] Other:      Comments:  Reports concerns about prior session exercises and mentioned something about MRI results for knee and back.  Pt did not want to reschedule at this time per conversation with office

## 2024-02-08 NOTE — TELEPHONE ENCOUNTER
She wants to know what to do about her back pain.  I told her that I would put her on the wait list but to come see you(her appt is in March) but since she cannot do PT, dr. Jones would need to discuss and re-exam to determine other options if needed.  I did explain that we do not prescribe pain medication and she can take extra strength tylenol and follow package directions.  I told her to make fu appt with orthopedic  Regarding the knee since her mri was abnormal and see what they suggest.

## 2024-02-26 ENCOUNTER — OFFICE VISIT (OUTPATIENT)
Dept: PHYSICAL MEDICINE AND REHAB | Age: 34
End: 2024-02-26
Payer: COMMERCIAL

## 2024-02-26 VITALS
WEIGHT: 213 LBS | TEMPERATURE: 98.7 F | HEART RATE: 72 BPM | SYSTOLIC BLOOD PRESSURE: 120 MMHG | DIASTOLIC BLOOD PRESSURE: 78 MMHG | HEIGHT: 63 IN | BODY MASS INDEX: 37.74 KG/M2

## 2024-02-26 DIAGNOSIS — G89.29 CHRONIC PAIN OF RIGHT KNEE: ICD-10-CM

## 2024-02-26 DIAGNOSIS — M25.561 CHRONIC PAIN OF RIGHT KNEE: ICD-10-CM

## 2024-02-26 DIAGNOSIS — M17.11 OSTEOARTHRITIS OF RIGHT KNEE, UNSPECIFIED OSTEOARTHRITIS TYPE: ICD-10-CM

## 2024-02-26 DIAGNOSIS — G89.29 CHRONIC NECK PAIN: ICD-10-CM

## 2024-02-26 DIAGNOSIS — G89.29 CHRONIC BILATERAL LOW BACK PAIN WITHOUT SCIATICA: Primary | ICD-10-CM

## 2024-02-26 DIAGNOSIS — M54.50 CHRONIC BILATERAL LOW BACK PAIN WITHOUT SCIATICA: Primary | ICD-10-CM

## 2024-02-26 DIAGNOSIS — M54.2 CHRONIC NECK PAIN: ICD-10-CM

## 2024-02-26 PROCEDURE — 99214 OFFICE O/P EST MOD 30 MIN: CPT | Performed by: STUDENT IN AN ORGANIZED HEALTH CARE EDUCATION/TRAINING PROGRAM

## 2024-02-26 PROCEDURE — G8417 CALC BMI ABV UP PARAM F/U: HCPCS | Performed by: STUDENT IN AN ORGANIZED HEALTH CARE EDUCATION/TRAINING PROGRAM

## 2024-02-26 PROCEDURE — G8427 DOCREV CUR MEDS BY ELIG CLIN: HCPCS | Performed by: STUDENT IN AN ORGANIZED HEALTH CARE EDUCATION/TRAINING PROGRAM

## 2024-02-26 PROCEDURE — 4004F PT TOBACCO SCREEN RCVD TLK: CPT | Performed by: STUDENT IN AN ORGANIZED HEALTH CARE EDUCATION/TRAINING PROGRAM

## 2024-02-26 PROCEDURE — G8484 FLU IMMUNIZE NO ADMIN: HCPCS | Performed by: STUDENT IN AN ORGANIZED HEALTH CARE EDUCATION/TRAINING PROGRAM

## 2024-02-26 RX ORDER — NORTRIPTYLINE HYDROCHLORIDE 25 MG/1
25 CAPSULE ORAL NIGHTLY
Qty: 30 CAPSULE | Refills: 1 | Status: SHIPPED | OUTPATIENT
Start: 2024-02-26

## 2024-02-27 ENCOUNTER — TELEPHONE (OUTPATIENT)
Dept: OBGYN | Age: 34
End: 2024-02-27

## 2024-03-11 ENCOUNTER — TELEPHONE (OUTPATIENT)
Dept: PHYSICAL MEDICINE AND REHAB | Age: 34
End: 2024-03-11

## 2024-03-11 DIAGNOSIS — M25.461 EFFUSION OF RIGHT KNEE JOINT: Primary | ICD-10-CM

## 2024-03-11 NOTE — TELEPHONE ENCOUNTER
Celena from IR dept called. Patient having knee aspiration on Monday next week. She is asking if you would like to have the fluid sent for testing?    Thank you.

## 2024-03-13 ENCOUNTER — HOSPITAL ENCOUNTER (OUTPATIENT)
Dept: ULTRASOUND IMAGING | Age: 34
Discharge: HOME | End: 2024-03-15
Payer: COMMERCIAL

## 2024-03-13 DIAGNOSIS — R10.11 ABDOMINAL PAIN, RIGHT UPPER QUADRANT: ICD-10-CM

## 2024-03-13 PROCEDURE — 76705 ECHO EXAM OF ABDOMEN: CPT

## 2024-03-18 ENCOUNTER — HOSPITAL ENCOUNTER (OUTPATIENT)
Dept: ULTRASOUND IMAGING | Age: 34
Discharge: HOME | End: 2024-03-20
Payer: COMMERCIAL

## 2024-03-18 DIAGNOSIS — G89.29 CHRONIC PAIN OF RIGHT KNEE: ICD-10-CM

## 2024-03-18 DIAGNOSIS — M25.461 EFFUSION OF RIGHT KNEE JOINT: ICD-10-CM

## 2024-03-18 DIAGNOSIS — M25.561 CHRONIC PAIN OF RIGHT KNEE: ICD-10-CM

## 2024-03-18 DIAGNOSIS — M17.11 OSTEOARTHRITIS OF RIGHT KNEE, UNSPECIFIED OSTEOARTHRITIS TYPE: ICD-10-CM

## 2024-03-18 PROCEDURE — 20611 DRAIN/INJ JOINT/BURSA W/US: CPT

## 2024-03-18 PROCEDURE — 89051 BODY FLUID CELL COUNT: CPT

## 2024-03-18 PROCEDURE — 10160 PNXR ASPIR ABSC HMTMA BULLA: CPT

## 2024-03-18 PROCEDURE — 87205 SMEAR GRAM STAIN: CPT

## 2024-03-18 PROCEDURE — 76942 ECHO GUIDE FOR BIOPSY: CPT

## 2024-03-18 PROCEDURE — 87075 CULTR BACTERIA EXCEPT BLOOD: CPT

## 2024-03-18 PROCEDURE — 89060 EXAM SYNOVIAL FLUID CRYSTALS: CPT

## 2024-03-18 PROCEDURE — 87070 CULTURE OTHR SPECIMN AEROBIC: CPT

## 2024-03-18 NOTE — BRIEF OP NOTE
Brief Postoperative Note    Maribel Coleman  YOB: 1990  [id number]    Pre-operative Diagnosis: Right knee pain    Post-operative Diagnosis: Same    Procedure: Right knee aspiration    Anesthesia: Local    Surgeons/Assistants: MD Luan    Estimated Blood Loss: less than 50     Complications: None    Specimens: Was Obtained:     Findings: Successful aspiration of right knee.  33 mL of yellow fluid aspirated.     Electronically signed by KEYLA Blank on 3/18/2024 at 9:38 AM

## 2024-03-18 NOTE — PROGRESS NOTES
Patient to IR for right knee aspiration.  Dr. Roland and KT/KP RTs at bedside.  Site prepped and draped, area numbed with lidocaine.  33ml of clear yellow fluid aspirated.  Specimen collected.  Band aid placed to site.  Patient tolerated well and is ambulatory from dept.

## 2024-03-19 LAB
BODY FLD TYPE: NORMAL
CRYSTALS FLD MICRO: NEGATIVE
LYMPHOCYTES NFR FLD: 30 %
NEUTROPHILS NFR FLD: 11 %
PATH INTERP FLD-IMP: NORMAL
RBC # FLD: <3000 CELLS/UL
SPECIMEN TYPE: NORMAL
UNIDENT CELLS NFR FLD: NORMAL %
WBC # FLD: 163 CELLS/UL

## 2024-03-24 LAB
MICROORGANISM SPEC CULT: NORMAL
MICROORGANISM/AGENT SPEC: NORMAL
SPECIMEN DESCRIPTION: NORMAL

## 2024-04-11 ENCOUNTER — TELEPHONE (OUTPATIENT)
Dept: GASTROENTEROLOGY | Age: 34
End: 2024-04-11

## 2024-06-12 ENCOUNTER — OFFICE VISIT (OUTPATIENT)
Dept: GASTROENTEROLOGY | Age: 34
End: 2024-06-12
Payer: COMMERCIAL

## 2024-06-12 ENCOUNTER — TELEPHONE (OUTPATIENT)
Dept: GASTROENTEROLOGY | Age: 34
End: 2024-06-12

## 2024-06-12 VITALS
DIASTOLIC BLOOD PRESSURE: 79 MMHG | HEART RATE: 101 BPM | TEMPERATURE: 97.7 F | WEIGHT: 211 LBS | BODY MASS INDEX: 37.39 KG/M2 | HEIGHT: 63 IN | SYSTOLIC BLOOD PRESSURE: 112 MMHG

## 2024-06-12 DIAGNOSIS — K21.9 GASTROESOPHAGEAL REFLUX DISEASE, UNSPECIFIED WHETHER ESOPHAGITIS PRESENT: Primary | ICD-10-CM

## 2024-06-12 PROCEDURE — G8417 CALC BMI ABV UP PARAM F/U: HCPCS | Performed by: INTERNAL MEDICINE

## 2024-06-12 PROCEDURE — 99204 OFFICE O/P NEW MOD 45 MIN: CPT | Performed by: INTERNAL MEDICINE

## 2024-06-12 PROCEDURE — 4004F PT TOBACCO SCREEN RCVD TLK: CPT | Performed by: INTERNAL MEDICINE

## 2024-06-12 PROCEDURE — G8427 DOCREV CUR MEDS BY ELIG CLIN: HCPCS | Performed by: INTERNAL MEDICINE

## 2024-06-12 RX ORDER — PANTOPRAZOLE SODIUM 40 MG/1
40 TABLET, DELAYED RELEASE ORAL
Qty: 60 TABLET | Refills: 0 | Status: SHIPPED | OUTPATIENT
Start: 2024-06-12 | End: 2024-06-12 | Stop reason: CLARIF

## 2024-06-12 RX ORDER — PANTOPRAZOLE SODIUM 40 MG/1
40 TABLET, DELAYED RELEASE ORAL DAILY
Qty: 60 TABLET | Refills: 2 | Status: SHIPPED | OUTPATIENT
Start: 2024-06-12

## 2024-06-12 NOTE — TELEPHONE ENCOUNTER
Procedure scheduled/Dr Clayton  Procedure: EGD.  Dx: Gerd.  Date: 8/15/24.  Time: 10:30 AM/ 9:00 AM Arrival.  Hospital: [facility].  [facility] phone call: TBD.  Bowel Prep instructions given: EGD Instructions.  In office/via phone: Office.  Clearance needed: N/A.

## 2024-06-12 NOTE — PROGRESS NOTES
obtained and pertinent positives and negatives were enumerated above in the history of present illness. All other reviewed systems / symptoms were negative.  Constitutional: No fever, no chills, no lethargy, no weakness.  HEENT:  No headache, otalgia, itchy eyes, nasal discharge or sore throat.  Cardiac:  No chest pain, dyspnea, orthopnea or PND.  Chest:   No cough, phlegm or wheezing.  Abdomen:       See above  Neuro:   No focal weakness, abnormal movements or seizure like activity.  Skin:   No rashes, no itching.  Extremities:  No swelling or joint pains.  ROS was otherwise negative    PHYSICAL EXAMINATION: Vital signs reviewed per the nursing documentation.     There were no vitals taken for this visit.  There is no height or weight on file to calculate BMI.     Constitutional: Patient is oriented to person, place, and time. Patient appears well-developed and well-nourished.   Head: Normocephalic and atraumatic.   Eyes: Pupils are equal, round, and reactive to light. EOM are normal.   Neck: Normal range of motion. Neck supple. No JVD present. No tracheal deviation present. No thyromegaly present.   Cardiovascular: Normal rate, regular rhythm, normal heart sounds and intact distal pulses.   Pulmonary/Chest: Effort normal and breath sounds normal. No stridor. No respiratory distress. No wheezes. No rales. No tenderness.   Abdominal: Soft. Bowel sounds are normal. No distension and no mass. There is no tenderness. There is no rebound and no guarding. No hernia.   Musculoskeletal: Normal range of motion.   Neurological: Patient is alert and oriented to person, place, and time.          LABORATORY DATA: Reviewed  Lab Results   Component Value Date    WBC 7.5 05/31/2023    HGB 12.5 05/31/2023    HCT 39.4 05/31/2023    MCV 86.6 05/31/2023     05/31/2023     07/08/2022    K 4.1 07/08/2022     07/08/2022    CO2 21 07/08/2022    BUN 8 07/08/2022    CREATININE 0.75 07/08/2022    BILITOT 0.21 (L)

## 2024-06-26 ENCOUNTER — INITIAL CONSULT (OUTPATIENT)
Dept: BARIATRICS/WEIGHT MGMT | Age: 34
End: 2024-06-26

## 2024-06-26 VITALS
HEART RATE: 80 BPM | BODY MASS INDEX: 37.03 KG/M2 | RESPIRATION RATE: 18 BRPM | SYSTOLIC BLOOD PRESSURE: 118 MMHG | HEIGHT: 63 IN | WEIGHT: 209 LBS | DIASTOLIC BLOOD PRESSURE: 70 MMHG

## 2024-06-26 DIAGNOSIS — R13.14 PHARYNGOESOPHAGEAL DYSPHAGIA: ICD-10-CM

## 2024-06-26 DIAGNOSIS — K21.9 GASTROESOPHAGEAL REFLUX DISEASE WITHOUT ESOPHAGITIS: Primary | ICD-10-CM

## 2024-06-26 DIAGNOSIS — E66.01 MORBID OBESITY DUE TO EXCESS CALORIES (HCC): ICD-10-CM

## 2024-06-26 NOTE — PROGRESS NOTES
[facility] INVASIVE BARIATRIC SURG  [address]  [address]  [place] [id number]  Dept: [phone]    ROBOTIC AND MINIMALLY INVASIVE SURGERY  PROGRESS NOTE INITIAL EVALUATION     Patient: Maribel Coleman        Service Date: 6/26/2024     HPI:     Chief Complaint   Patient presents with    Gastroesophageal Reflux    Surgical Consult       History: 34 y.o. female who presents today for evaluation of reflux. States she was recently diagnosed with fatty liver disease. Reports her reflux has been present for approximately 8 years, but has become more severe in the last 5 years. She currently experiences acid reflux. Reports she has been taking Protonix with relief. Patient does smoke, denies drinking. Reports drinking coffee intermittently. Notes nausea and intermittent abdominal pain. Patient does experience sensation of food becoming lodged, this occurs nearly every time she eats. Notes history of 4 abortions. Denies any surgeries. She is scheduled for an EGD on 8/15/2024 with Dr. Clayton. Patient reports regular bowel movements. She denies vomiting, fever, and chills. The patient denies  a history of myocardial infarction, deep vein thrombosis, pulmonary embolism, renal failure, hepatic failure, and stroke. Notes family history of embolism in her mother and maternal aunts, these onset after working on their feet for extended periods of time.     Medical History:  Past Medical History:   Diagnosis Date    Snores     not yet tested for apnea       Surgical History:  Past Surgical History:   Procedure Laterality Date    DILATION AND CURETTAGE OF UTERUS N/A 2017    elective termination of pregnancy       Family History:      Problem Relation Age of Onset    COPD Mother     Thyroid Disease Mother     Deep Vein Thrombosis Mother        Social History:   Social History     Tobacco Use    Smoking status: Every Day     Current packs/day: 0.25     Average packs/day:

## 2024-06-27 ENCOUNTER — HOSPITAL ENCOUNTER (EMERGENCY)
Age: 34
Discharge: HOME | End: 2024-06-27
Attending: EMERGENCY MEDICINE
Payer: COMMERCIAL

## 2024-06-27 ENCOUNTER — APPOINTMENT (OUTPATIENT)
Dept: GENERAL RADIOLOGY | Age: 34
End: 2024-06-27
Payer: COMMERCIAL

## 2024-06-27 VITALS
DIASTOLIC BLOOD PRESSURE: 96 MMHG | RESPIRATION RATE: 18 BRPM | OXYGEN SATURATION: 99 % | TEMPERATURE: 98.7 F | HEART RATE: 90 BPM | SYSTOLIC BLOOD PRESSURE: 157 MMHG
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
## 2024-06-27 DIAGNOSIS — S61.452A DOG BITE OF LEFT HAND, INITIAL ENCOUNTER: Primary | ICD-10-CM

## 2024-06-27 PROCEDURE — 99284 EMERGENCY DEPT VISIT MOD MDM: CPT

## 2024-06-27 PROCEDURE — 6370000000 HC RX 637 (ALT 250 FOR IP)

## 2024-06-27 PROCEDURE — 90471 IMMUNIZATION ADMIN: CPT

## 2024-06-27 PROCEDURE — 6360000002 HC RX W HCPCS

## 2024-06-27 PROCEDURE — 73130 X-RAY EXAM OF HAND: CPT

## 2024-06-27 PROCEDURE — 90715 TDAP VACCINE 7 YRS/> IM: CPT

## 2024-06-27 RX ORDER — ACETAMINOPHEN 325 MG/1
650 TABLET ORAL EVERY 6 HOURS PRN
Qty: 120 TABLET | Refills: 0 | Status: SHIPPED | OUTPATIENT
Start: 2024-06-27

## 2024-06-27 RX ORDER — ACETAMINOPHEN 500 MG
1000 TABLET ORAL ONCE
Status: COMPLETED | OUTPATIENT
Start: 2024-06-27 | End: 2024-06-27

## 2024-06-27 RX ORDER — AMOXICILLIN AND CLAVULANATE POTASSIUM 875; 125 MG/1; MG/1
1 TABLET, FILM COATED ORAL ONCE
Status: COMPLETED | OUTPATIENT
Start: 2024-06-27 | End: 2024-06-27

## 2024-06-27 RX ORDER — IBUPROFEN 600 MG/1
600 TABLET ORAL EVERY 6 HOURS PRN
Qty: 30 TABLET | Refills: 0 | Status: SHIPPED | OUTPATIENT
Start: 2024-06-27

## 2024-06-27 RX ORDER — AMOXICILLIN AND CLAVULANATE POTASSIUM 875; 125 MG/1; MG/1
1 TABLET, FILM COATED ORAL 2 TIMES DAILY
Qty: 14 TABLET | Refills: 0 | Status: SHIPPED | OUTPATIENT
Start: 2024-06-27 | End: 2024-07-04

## 2024-06-27 RX ORDER — IBUPROFEN 800 MG/1
800 TABLET ORAL ONCE
Status: COMPLETED | OUTPATIENT
Start: 2024-06-27 | End: 2024-06-27

## 2024-06-27 RX ADMIN — AMOXICILLIN AND CLAVULANATE POTASSIUM 1 TABLET: 875; 125 TABLET, FILM COATED ORAL at 10:11

## 2024-06-27 RX ADMIN — ACETAMINOPHEN 1000 MG: 500 TABLET ORAL at 10:11

## 2024-06-27 RX ADMIN — TETANUS TOXOID, REDUCED DIPHTHERIA TOXOID AND ACELLULAR PERTUSSIS VACCINE, ADSORBED 0.5 ML: 5; 2.5; 8; 8; 2.5 SUSPENSION INTRAMUSCULAR at 10:08

## 2024-06-27 RX ADMIN — IBUPROFEN 800 MG: 800 TABLET, FILM COATED ORAL at 10:11

## 2024-06-27 ASSESSMENT — PAIN DESCRIPTION - LOCATION: LOCATION: HAND

## 2024-06-27 ASSESSMENT — PAIN SCALES - GENERAL: PAINLEVEL_OUTOF10: 10

## 2024-06-27 NOTE — DISCHARGE INSTRUCTIONS
You were seen in the emergency department for dog bite to your left hand.  Your vital signs were stable.  No fever noted.  The wounds were irrigated with an entire bottle of saline.  Your hand was wrapped with gauze and Kerlix.    X-ray did not show any foreign body or broken bones.    We updated your tetanus and also gave you your first dose of antibiotics.    I have sent Tylenol, ibuprofen, and antibiotics to the pharmacy.  Please take the antibiotics as prescribed to complete the entire course.    Please keep your hand covered at all times to avoid any infection.    Please follow-up with your primary care provider in 1 week given recent ER visit.    Please return to the emergency department if you notice any signs of infection such as surrounding redness, swelling, pustular drainage, or if the numbness that you had been feeling in your left index and middle finger does not improve.

## 2024-06-27 NOTE — ED PROVIDER NOTES
[facility]     Emergency Department     Faculty Attestation    I performed a history and physical examination of the patient and discussed management with the resident. I have reviewed and agree with the resident’s findings including all diagnostic interpretations, and treatment plans as written at the time of my review. Any areas of disagreement are noted on the chart. I was personally present for the key portions of any procedures. I have documented in the chart those procedures where I was not present during the key portions. For Physician Assistant/ Nurse Practitioner cases/documentation I have personally evaluated this patient and have completed at least one if not all key elements of the E/M (history, physical exam, and MDM). Additional findings are as noted.    PtName: Maribel Coleman  MRN: [id number]  Birthdate 1990  Date of evaluation: 6/27/24  Note Started: 10:21 AM EDT    Primary Care Physician: Adeline Tucker, TONI Lowery NP        History: This is a 34 y.o. female who presents to the Emergency Department with complaint of Dog bite.  Patient was bit in the left hand by her dog.  Patient is right-hand dominant.  She does complain of some tingling and numbness in her fingers    Physical:   oral temperature is 98.7 °F (37.1 °C). Her blood pressure is 157/96 (abnormal) and her pulse is 90. Her respiration is 18 and oxygen saturation is 99%.  .There are dog bite wounds noted on the palmar and dorsal aspect of the hand.  No active bleeding is noted.  Patient has full range of motion of the fingers.  Sensation to light touch intact.    Impression: Dog bite    Plan: X-ray, analgesia, antibiotics, update tetanus immunization      Medical Decision Making  Problems Addressed:  Dog bite of left hand, initial encounter: acute illness or injury    Amount and/or Complexity of Data Reviewed  Radiology: ordered.    Risk  OTC drugs.  Prescription drug  management.            (Please note that portions of this note were completed with a voice recognition program.  Efforts were made to edit the dictations but occasionally words are mis-transcribed.)    Michael Jackson MD, FACEP  Attending Emergency Medicine Physician         Michael Jackson MD  06/27/24 [id number]

## 2024-06-27 NOTE — ED TRIAGE NOTES
Pt to ED via triage with complaints of being bit by her dog this morning. Pt states she is not up to date on her tetanus and that she has not taken any medication for the pain. Pt has two small lacerations to left hand, bleeding is controlled. Pt is on stretcher with call light, no other needs stated.

## 2024-06-27 NOTE — ED PROVIDER NOTES
[facility] ED  Emergency Department Encounter  Emergency Medicine Resident     Pt Name:Maribel Coleman  MRN: [id number]  Birthdate 1990  Date of evaluation: 6/27/24  PCP:  Adeline Tuckre APRN - NP  Note Started: 9:53 AM EDT      CHIEF COMPLAINT       Chief Complaint   Patient presents with    Animal Bite     Dog        HISTORY OF PRESENT ILLNESS  (Location/Symptom, Timing/Onset, Context/Setting, Quality, Duration, Modifying Factors, Severity.)      Maribel Coleman is a 34 y.o. female who presents with a dog bite to her left hand.  Patient states it is her dog.  She was trying to get her dog into the car when it bit her left hand.  Patient came immediately here for evaluation.  She is reporting some numbness to her left index and middle finger.  She is unsure when her tetanus was last updated.  She is right-hand dominant.    PAST MEDICAL / SURGICAL / SOCIAL / FAMILY HISTORY      has a past medical history of Snores.     has a past surgical history that includes Dilation and curettage of uterus (N/A, 2017).    Social History     Socioeconomic History    Marital status: Single     Spouse name: Not on file    Number of children: Not on file    Years of education: Not on file    Highest education level: Not on file   Occupational History    Not on file   Tobacco Use    Smoking status: Every Day     Current packs/day: 0.25     Average packs/day: 0.3 packs/day for 5.0 years (1.3 ttl pk-yrs)     Types: Cigarettes    Smokeless tobacco: Never   Vaping Use    Vaping Use: Some days   Substance and Sexual Activity    Alcohol use: Never    Drug use: Not Currently     Types: Marijuana (Weed)     Comment: 1 joint per day    Sexual activity: Not Currently   Other Topics Concern    Not on file   Social History Narrative    Not on file     Social Determinants of Health     Financial Resource Strain: Not on file   Food Insecurity: Not on file   Transportation Needs: Not on file   Physical Activity: Not on       Comments: Tenderness around bite marks to left hand   Skin:     Findings: Lesion present.      Comments: Less than 1 cm superficial laceration noted to the palmar aspect of left hand between the left index and left middle finger webspace.  Superficial laceration noted to the palmar aspect of the left middle finger over the PIP joint.  Superficial laceration noted to the dorsal aspect of left hand between the left index and left middle finger webspace.   Neurological:      Mental Status: She is alert and oriented to person, place, and time.      Sensory: Sensory deficit present.      Comments: Diminished sensation to the palmar aspect of left index and middle finger       DDX/DIAGNOSTIC RESULTS / EMERGENCY DEPARTMENT COURSE / MDM     Medical Decision Making  34-year-old female who presents with a dog bite to her left hand.  Patient states it is her dog.  She was trying to get her dog into the car when it bit her left hand.  Patient came immediately here for evaluation.  She is reporting some numbness to her left index and middle finger.  She is unsure when her tetanus was last updated.  She is right-hand dominant.  Patient is not in acute distress.  Mildly hypertensive on arrival.  Other vital signs stable.  No fever noted.  Less than 1 cm superficial laceration noted to the palmar aspect of left hand between the left index and left middle finger webspace.  Superficial laceration noted to the palmar aspect of the left middle finger over the PIP joint.  Superficial laceration noted to the dorsal aspect of left hand between the left index and left middle finger webspace.  Mild tenderness surrounding the bite marks.  Diminished sensation to the palmar aspect of left index and middle finger.  Strength intact.  Radial pulse in left upper extremity 2+.  Will order x-ray to rule out any foreign body.  Will irrigate the wound.  No need for suture repair.  Will update tetanus.  Pain control.  Plan for discharge on

## 2024-07-19 ENCOUNTER — TELEPHONE (OUTPATIENT)
Dept: BARIATRICS/WEIGHT MGMT | Age: 34
End: 2024-07-19

## 2024-07-19 NOTE — TELEPHONE ENCOUNTER
Unable to leave message on phone, calling restrictions, left [facility] message, patient has an egd scheduled at [facility] on 7-26-24 and needs to arrive by 7:10 am

## 2024-07-24 ENCOUNTER — TELEPHONE (OUTPATIENT)
Dept: PHYSICAL MEDICINE AND REHAB | Age: 34
End: 2024-07-24

## 2024-07-24 NOTE — TELEPHONE ENCOUNTER
Patient called in today to inform you that she is having fluid accumulating on her right knee again. Please advise.

## 2024-07-30 NOTE — TELEPHONE ENCOUNTER
Left a message on patient's voicemail that she could follow up with orthopedic surgery and if she'd like to call us back we could get a follow up with Dr Jones scheduled as well.

## 2024-08-23 ENCOUNTER — TELEPHONE (OUTPATIENT)
Dept: BARIATRICS/WEIGHT MGMT | Age: 34
End: 2024-08-23

## (undated) DEVICE — APPLICATOR MEDICATED 26 CC SOLUTION HI LT ORNG CHLORAPREP

## (undated) DEVICE — GLOVE ORANGE PI 8 1/2   MSG9085

## (undated) DEVICE — DRAPE,U/ SHT,SPLIT,PLAS,STERIL: Brand: MEDLINE

## (undated) DEVICE — BANDAGE,GAUZE,BULKEE II,4.5"X4.1YD,STRL: Brand: MEDLINE

## (undated) DEVICE — GLOVE ORANGE PI 8   MSG9080

## (undated) DEVICE — SVMMC ORTH SPL DRP PK

## (undated) DEVICE — APPLICATOR MEDICATED 10.5 CC SOLUTION HI LT ORNG CHLORAPREP

## (undated) DEVICE — DRESSING,GAUZE,XEROFORM,CURAD,1"X8",ST: Brand: CURAD

## (undated) DEVICE — GLOVE,EXAM,NITRILE,RESTORE,OAT SENSE,L: Brand: MEDLINE

## (undated) DEVICE — SHEET,DRAPE,70X100,STERILE: Brand: MEDLINE

## (undated) DEVICE — CORD ES L12FT BPLR FRCP

## (undated) DEVICE — GLOVE ORANGE PI 7   MSG9070

## (undated) DEVICE — GLOVE SURG SZ 65 THK91MIL LTX FREE SYN POLYISOPRENE

## (undated) DEVICE — INTENDED FOR TISSUE SEPARATION, AND OTHER PROCEDURES THAT REQUIRE A SHARP SURGICAL BLADE TO PUNCTURE OR CUT.: Brand: BARD-PARKER ® CARBON RIB-BACK BLADES

## (undated) DEVICE — CUFF REPROC TRNQT DPSB W/PLC RED 18IN

## (undated) DEVICE — TUBING SUCT 10FR MAL ALUM SHFT FN CAP VENT UNIV CONN W/ OBT

## (undated) DEVICE — SUTURE ETHLN SZ 2-0 L18IN NONABSORBABLE BLK L26MM PS 3/8 585H

## (undated) DEVICE — STRAP,POSITIONING,KNEE/BODY,FOAM,4X60": Brand: MEDLINE